# Patient Record
Sex: FEMALE | Race: WHITE | NOT HISPANIC OR LATINO | Employment: FULL TIME | URBAN - METROPOLITAN AREA
[De-identification: names, ages, dates, MRNs, and addresses within clinical notes are randomized per-mention and may not be internally consistent; named-entity substitution may affect disease eponyms.]

---

## 2018-06-27 ENCOUNTER — TRANSFERRED RECORDS (OUTPATIENT)
Dept: HEALTH INFORMATION MANAGEMENT | Facility: CLINIC | Age: 53
End: 2018-06-27

## 2018-06-27 LAB
HPV ABSTRACT: NORMAL
PAP-ABSTRACT: NORMAL

## 2019-04-14 ENCOUNTER — APPOINTMENT (OUTPATIENT)
Dept: GENERAL RADIOLOGY | Facility: CLINIC | Age: 54
End: 2019-04-14
Attending: STUDENT IN AN ORGANIZED HEALTH CARE EDUCATION/TRAINING PROGRAM

## 2019-04-14 ENCOUNTER — HOSPITAL ENCOUNTER (EMERGENCY)
Facility: CLINIC | Age: 54
Discharge: HOME | End: 2019-04-14
Attending: STUDENT IN AN ORGANIZED HEALTH CARE EDUCATION/TRAINING PROGRAM | Admitting: STUDENT IN AN ORGANIZED HEALTH CARE EDUCATION/TRAINING PROGRAM
Payer: OTHER MISCELLANEOUS

## 2019-04-14 VITALS
HEIGHT: 67 IN | SYSTOLIC BLOOD PRESSURE: 147 MMHG | RESPIRATION RATE: 16 BRPM | DIASTOLIC BLOOD PRESSURE: 91 MMHG | TEMPERATURE: 98.3 F | OXYGEN SATURATION: 99 % | WEIGHT: 160 LBS | BODY MASS INDEX: 25.11 KG/M2

## 2019-04-14 DIAGNOSIS — S82.892A ANKLE FRACTURE, LEFT, CLOSED, INITIAL ENCOUNTER: ICD-10-CM

## 2019-04-14 PROBLEM — I10 ESSENTIAL HYPERTENSION: Status: ACTIVE | Noted: 2018-06-27

## 2019-04-14 PROBLEM — Z12.4 CERVICAL CANCER SCREENING: Status: ACTIVE | Noted: 2018-07-05

## 2019-04-14 PROCEDURE — 27786 TREATMENT OF ANKLE FRACTURE: CPT | Mod: LT | Performed by: STUDENT IN AN ORGANIZED HEALTH CARE EDUCATION/TRAINING PROGRAM

## 2019-04-14 PROCEDURE — 99284 EMERGENCY DEPT VISIT MOD MDM: CPT | Mod: 25 | Performed by: STUDENT IN AN ORGANIZED HEALTH CARE EDUCATION/TRAINING PROGRAM

## 2019-04-14 PROCEDURE — 27786 TREATMENT OF ANKLE FRACTURE: CPT | Mod: 54 | Performed by: STUDENT IN AN ORGANIZED HEALTH CARE EDUCATION/TRAINING PROGRAM

## 2019-04-14 PROCEDURE — 73610 X-RAY EXAM OF ANKLE: CPT | Mod: LT

## 2019-04-14 RX ORDER — OXYCODONE HYDROCHLORIDE 5 MG/1
2.5-1 TABLET ORAL EVERY 6 HOURS PRN
Qty: 10 TABLET | Refills: 0 | Status: SHIPPED | OUTPATIENT
Start: 2019-04-14 | End: 2020-09-09

## 2019-04-14 RX ORDER — LOSARTAN POTASSIUM 50 MG/1
50 TABLET ORAL
COMMUNITY
Start: 2018-06-27 | End: 2020-09-09

## 2019-04-14 RX ORDER — AMPICILLIN TRIHYDRATE 500 MG
4000 CAPSULE ORAL
COMMUNITY
Start: 2016-05-13 | End: 2023-10-09

## 2019-04-14 ASSESSMENT — MIFFLIN-ST. JEOR: SCORE: 1353.39

## 2019-04-14 NOTE — ED AVS SNAPSHOT
[facility] Emergency Department  [address] [address]  Phone:  [phone]  Fax:  [phone]                                    Araceli Wilson   MRN: [id number]    Department:  [facility] Emergency Department   Date of Visit:  4/14/2019           After Visit Summary Signature Page    I have received my discharge instructions, and my questions have been answered. I have discussed any challenges I see with this plan with the nurse or doctor.    ..........................................................................................................................................  Patient/Patient Representative Signature      ..........................................................................................................................................  Patient Representative Print Name and Relationship to Patient    ..................................................               ................................................  Date                                   Time    ..........................................................................................................................................  Reviewed by Signature/Title    ...................................................              ..............................................  Date                                               Time          22EPIC Rev 08/18

## 2019-04-14 NOTE — ED PROVIDER NOTES
History     Chief Complaint   Patient presents with     Trauma     slipped on ice last noc, still having pain this am, unable to BW, CMS intact      HPI  Araceli Wilson is a 53 year old female who presents for evaluation of left lateral ankle pain after injury sustained last evening.  Patient extends at around 10 PM last night she was walking around a corner and slipped on the ice outdoors causing her to fall onto her left side.  She adamantly denies striking her head or suffering loss of consciousness.  She had significant left lateral ankle pain but was able to ambulate despite the pain.  She has had increasing pain and swelling today despite elevation and icing.  She denies neck pain, back pain, pelvic pain, knee pain, foot pain, or other injuries.    Allergies:  No Known Allergies    Problem List:    Patient Active Problem List    Diagnosis Date Noted     Cervical cancer screening 2018     Priority: Medium     From visit on 18:  History of abnormal pap tests?  No   NILM  2009 NILM    NILM ,neg HPV  52 y.o.  Plan: Cotesting 2023       Essential hypertension 2018     Priority: Medium        Past Medical History:    No past medical history on file.    Past Surgical History:    Past Surgical History:   Procedure Laterality Date     SURGICAL HISTORY OF -   [id number]     x2       Family History:    No family history on file.    Social History:  Marital Status:   [4]  Social History     Tobacco Use     Smoking status: Not on file   Substance Use Topics     Alcohol use: Not on file     Drug use: Not on file        Medications:      Cholecalciferol (D 1000) 1000 units CAPS   losartan (COZAAR) 50 MG tablet   oxyCODONE (ROXICODONE) 5 MG tablet         Review of Systems  Constitutional:  Negative for fever or recent illness.  Respiratory:  Negative for shortness of breath.  Gastrointestinal:  Negative for abdominal pain.  Musculoskeletal: Positive for left lateral ankle pain and  "swelling.  Neurological:  Negative for weakness or sensory deficit.  Skin:  Negative for laceration or bleeding.    All others reviewed and are negative.      Physical Exam   BP: (!) 147/91  Heart Rate: 84  Temp: 98.3  F (36.8  C)  Resp: 16  Height: 170.2 cm (5' 7\")  Weight: 72.6 kg (160 lb)  SpO2: 99 %      Physical Exam  Constitutional:  Well developed, well nourished.  Appears nontoxic and in no acute distress.   HENT:  Normocephalic and atraumatic.  Eyes:  Conjunctivae are normal.  Neck:  Neck supple.  Cardiovascular:  No cyanosis.   Respiratory:  Effort normal, no respiratory distress.   Musculoskeletal: Positive for left lateral malleolar tenderness and swelling but no obvious deformity or wound.  L5 dorsiflexion and dorsal foot sensory intact.  S1 plantar flexion and plantar foot sensation intact.  Achilles tendon intact.  Sensation intact of all digits diffusely, including deep fibular distribution of first webbing space.  2/4 palpable dorsalis pedis and posterior tibial pulses.  No cyanosis and capillary refill less than 2 seconds in each digit.    Neurological:  Patient is alert.  Skin:  Skin is warm and dry.  Psychiatric:  Normal mood and affect.      ED Course        Procedures               Critical Care time:  none               Results for orders placed or performed during the hospital encounter of 04/14/19 (from the past 24 hour(s))   XR Ankle Left G/E 3 Views    Narrative    LEFT ANKLE THREE VIEWS  4/14/2019 11:07 AM     HISTORY: Injury.    COMPARISON: None.      Impression    IMPRESSION: Minimally displaced distal fibular fracture extending from  the level of the ankle mortise superiorly and posteriorly. No  dislocation.    JENNY BUTTERFIELD MD       Medications - No data to display    Assessments & Plan (with Medical Decision Making)   Araceli Wilson is a 53 year old female who presents to the emergency department for evaluation of left lateral ankle pain and swelling.  Based on her symptoms and " the clinical examination, there is no evidence to suggest deformity, skin injury, tendon rupture, or neurovascular deficits.  Radiographic imaging was independently reviewed and a distal left fibular fracture was identified.  Radiologist read is consistent with findings.  She was placed in a splint and instructed to remain nonweightbearing with use of crutches until orthopedic follow-up over the next 3-5 days.  Should also rest, ice, and elevate.  She was also provided a prescription for a short course of analgesia to take only as directed.        Disclaimer:  This note consists of symbols derived from keyboarding, dictation, and/or voice recognition software.  As a result, there may be errors in the script that have gone undetected.  Please consider this when interpreting information found in the chart.         I have reviewed the nursing notes.    I have reviewed the findings, diagnosis, plan and need for follow up with the patient.          Medication List      Started    oxyCODONE 5 MG tablet  Commonly known as:  ROXICODONE  2.5-10 mg, Oral, EVERY 6 HOURS PRN            Final diagnoses:   Ankle fracture, left, closed, initial encounter       4/14/2019   [facility] EMERGENCY DEPARTMENT     Rodri Estrada DO  04/14/19 1723

## 2019-06-14 ENCOUNTER — HOSPITAL ENCOUNTER (OUTPATIENT)
Dept: PHYSICAL THERAPY | Facility: CLINIC | Age: 54
Setting detail: THERAPIES SERIES
End: 2019-06-14
Attending: PODIATRIST
Payer: OTHER MISCELLANEOUS

## 2019-06-14 PROCEDURE — 97110 THERAPEUTIC EXERCISES: CPT | Mod: GP | Performed by: PHYSICAL THERAPIST

## 2019-06-14 PROCEDURE — 97161 PT EVAL LOW COMPLEX 20 MIN: CPT | Mod: GP | Performed by: PHYSICAL THERAPIST

## 2019-06-14 NOTE — PROGRESS NOTES
06/14/19 1000   General Information   Type of Visit Initial OP Ortho PT Evaluation   Start of Care Date 06/14/19   Referring Physician Dr. Yan Otto   Patient/Family Goals Statement To walk normally   Orders Evaluate and Treat   Date of Order 05/30/19   Medical Diagnosis L oblique fibular fx, minimally displaced   Body Part(s)   Body Part(s) Ankle/Foot   Presentation and Etiology   Pertinent history of current problem (include personal factors and/or comorbidities that impact the POC) Pt states on 4/13/19 she fell on the ice at work and L fx fibula.  Pt wore aircast and was non wt bearing until last week.   Pt had been using leg scooter.  Pt notes intermittent pain 4/10.    Pt notes decreased motion and ongoing swelling.   Meds: ibuprofen prn.   Pt notes tingling in all toes constant.  PMHX:  unremarkable.   Mod: job tasks   Impairments B. Decreased WB tolerance;C. Swelling;D. Decreased ROM;F. Decreased strength and endurance;H. Impaired gait   Onset date of current episode/exacerbation 04/13/19   Pain quality A. Sharp;C. Aching   Pain exacerbation comment walking--cont to limp w/ restricted wt bearing.  Walking tolerance 1/2 block.  Stairs has been doing on her buttocks, just started marked time.  Wakes a couple times/ night.     Pain/symptoms eased by A. Sitting;C. Rest;F. Certain positions   Progression of symptoms since onset: Improved   Prior Level of Function   Functional Level Prior Comment gardens, fishing, shopping   Current Level of Function   Patient role/employment history A. Employed   Employment Comments ,   at night.     Fall Risk Screen   Fall screen completed by PT   Have you fallen 2 or more times in the past year? No   Have you fallen and had an injury in the past year? Yes   Timed Up and Go score (seconds) deferred w/ limited wt bearing.     Abuse Screen (yes response referral indicated)   Feels Unsafe at Home or Work/School no   Feels Threatened by Someone no   Does  Anyone Try to Keep You From Having Contact with Others or Doing Things Outside Your Home? no   Ankle/Foot Objective Findings   Side (if bilateral, select both right and left) Left   Integumentary Mild swelling.  Fig 8   1 cm difference.  R 53.0, L 54.0 cm   Gait/Locomotion with 1 crutch and trilock brace on mild + limp and holds L LE in abd position.    Ankle/Foot Strength Comments L hip flex/ abd 5/5;  isometric L ankle DF/PF good,  inv/ ev 4-/5.    Left DF (Knee Ext) AROM R 2*,  L lacks 20* of DF to neutral   Left PF AROM R 55*,  L 40*   Left Calcanceal Inversion AROM R 35*,  L 12*   Left Calcaneal Eversion AROM R 10*, L 4*   Planned Therapy Interventions   Planned Therapy Interventions manual therapy;ROM;strengthening;stretching;neuromuscular re-education;gait training   Clinical Impression   Criteria for Skilled Therapeutic Interventions Met yes, treatment indicated   PT Diagnosis s/p L fibular fx   Influenced by the following impairments pain, swelliing, decreased motion, decreased strength   Functional limitations due to impairments walking, stairs,   sleeping, working   Clinical Presentation Stable/Uncomplicated   Clinical Presentation Rationale progressing as expected following fracture   Clinical Decision Making (Complexity) Low complexity   Therapy Frequency 2 times/Week   Predicted Duration of Therapy Intervention (days/wks) 3 weeks then 1X/wk X 4 = 10 visits   Risk & Benefits of therapy have been explained Yes   Patient, Family & other staff in agreement with plan of care Yes   Education Assessment   Barriers to Learning No barriers   Ortho Goal 1   Goal Description 1.  Pt will walk w/o device w/ slight limp   Target Date 07/14/19   Ortho Goal 2   Goal Description 2.  Pt will be able to do stairs reciprocal w/ minimal difficulty   Target Date 08/13/19   Ortho Goal 3   Goal Description 3.  Pt will wake no > 3X/wk due to ankle   Target Date 08/13/19   Ortho Goal 4   Goal Description 4.  Pt will be able to  walk 30 min w/o limp   Target Date 08/13/19   Ortho Goal 5   Goal Description 5. Pt will be independent and consistent w/HEP   Target Date 08/13/19   Total Evaluation Time   PT Eval, Low Complexity Minutes (18296) 20     Thank you for this referral,    Cassandra Leon, PT,  CEAS   #[id number]  [facility] Dept.  [phone]

## 2019-06-17 ENCOUNTER — HOSPITAL ENCOUNTER (OUTPATIENT)
Dept: PHYSICAL THERAPY | Facility: CLINIC | Age: 54
Setting detail: THERAPIES SERIES
End: 2019-06-17
Attending: PODIATRIST
Payer: OTHER MISCELLANEOUS

## 2019-06-17 PROCEDURE — 97110 THERAPEUTIC EXERCISES: CPT | Mod: GP | Performed by: PHYSICAL THERAPIST

## 2019-06-21 ENCOUNTER — HOSPITAL ENCOUNTER (OUTPATIENT)
Dept: PHYSICAL THERAPY | Facility: CLINIC | Age: 54
Setting detail: THERAPIES SERIES
End: 2019-06-21
Attending: PODIATRIST
Payer: OTHER MISCELLANEOUS

## 2019-06-21 PROCEDURE — 97110 THERAPEUTIC EXERCISES: CPT | Mod: GP | Performed by: PHYSICAL THERAPIST

## 2019-06-24 ENCOUNTER — HOSPITAL ENCOUNTER (OUTPATIENT)
Dept: PHYSICAL THERAPY | Facility: CLINIC | Age: 54
Setting detail: THERAPIES SERIES
End: 2019-06-24
Attending: PODIATRIST
Payer: OTHER MISCELLANEOUS

## 2019-06-24 PROCEDURE — 97110 THERAPEUTIC EXERCISES: CPT | Mod: GP | Performed by: PHYSICAL THERAPIST

## 2019-07-03 ENCOUNTER — HOSPITAL ENCOUNTER (OUTPATIENT)
Dept: PHYSICAL THERAPY | Facility: CLINIC | Age: 54
Setting detail: THERAPIES SERIES
End: 2019-07-03
Attending: PODIATRIST
Payer: OTHER MISCELLANEOUS

## 2019-07-03 PROCEDURE — 97110 THERAPEUTIC EXERCISES: CPT | Mod: GP | Performed by: PHYSICAL THERAPIST

## 2019-07-12 ENCOUNTER — HOSPITAL ENCOUNTER (OUTPATIENT)
Dept: PHYSICAL THERAPY | Facility: CLINIC | Age: 54
Setting detail: THERAPIES SERIES
End: 2019-07-12
Attending: PODIATRIST
Payer: OTHER MISCELLANEOUS

## 2019-07-12 PROCEDURE — 97110 THERAPEUTIC EXERCISES: CPT | Mod: GP | Performed by: PHYSICAL THERAPIST

## 2019-07-23 ENCOUNTER — HOSPITAL ENCOUNTER (OUTPATIENT)
Dept: PHYSICAL THERAPY | Facility: CLINIC | Age: 54
Setting detail: THERAPIES SERIES
End: 2019-07-23
Attending: PODIATRIST
Payer: OTHER MISCELLANEOUS

## 2019-07-23 PROCEDURE — 97110 THERAPEUTIC EXERCISES: CPT | Mod: GP | Performed by: PHYSICAL THERAPIST

## 2019-07-30 ENCOUNTER — HOSPITAL ENCOUNTER (OUTPATIENT)
Dept: PHYSICAL THERAPY | Facility: CLINIC | Age: 54
Setting detail: THERAPIES SERIES
End: 2019-07-30
Attending: PODIATRIST
Payer: OTHER MISCELLANEOUS

## 2019-07-30 PROCEDURE — 97110 THERAPEUTIC EXERCISES: CPT | Mod: GP | Performed by: PHYSICAL THERAPIST

## 2019-07-30 NOTE — PROGRESS NOTES
"   OUTPATIENT PHYSICAL THERAPY PROGRESS NOTE    Dr. Yan Otto      6/14/19 to 07/30/19 1100   Signing Clinician's Name / Credentials   Signing clinician's name / credentials Cassandra Leon, PT [id number]   Session Number   Session Number 7    Authorization status 11 approved thru 8/13/19   Ortho Goal 1   Goal Description 1.  Pt will walk w/o device w/ slight limp.  7/3/19 mild limp.  7/30/19 slgiht limp MET   Target Date 07/14/19   Ortho Goal 2   Goal Description 2.  Pt will be able to do stairs reciprocal w/ minimal difficulty   Target Date 08/13/19   Ortho Goal 3   Goal Description 3.  Pt will wake no > 3X/wk due to ankle   Target Date 08/13/19   Ortho Goal 4   Goal Description 4.  Pt will be able to walk 30 min w/o limp   Target Date 08/13/19   Ortho Goal 5   Goal Description 5. Pt will be independent and consistent w/HEP.  7/30/19 MET   Target Date 08/13/19   Subjective Report   Subjective Report Pt sees MD 8/1/19. Pt reports she is frustrated w/ not being back to normal.   Pt cont to report tightness across top of foot which limits toes to bend w/ gait.  Pt also reports swelling which limits ankle joint.  Pt states she is on her feet alot/ active (reinforced ice/ elevation and to avoid overdoing w/ activity).     Objective Measures   Objective Measure slight limp, lacks full push off due to pain top of foot/ base of toes.     Details AROM L ankle DF to neutral,  PF 40, inversion 28*, eversion 10*   Objective Measure standing gastroc stretch R 30*, L 27*   Details mild + swelling noted   Therapeutic Procedure/exercise   Treatment Detail Scifit seat 8 L 4 X 2 min.   Gastroc stretch 30\" X 2 ( incline board).      2\" step down x 15 w/ UE support (noted some hip irritation).     Partial squats in front of mirror x 15.  Heel toe walk 20' X 2.   SLS 30\" X 2 (5 touches both times ).   tandem stand on foam (L foot back)  30\" X 2 ( 1 touch, 0 touch)..    Seated heelraises X 10 (pt reports lateral ankle and dorsal foot " pain at metatarsals).  PROM of toes w/ PT ( appear WNL)   Plan   Home program ex as above, ice prn   Plan cont 1X/wk progress ex program   Comments   Comments Progress as noted above.

## 2019-08-06 ENCOUNTER — HOSPITAL ENCOUNTER (OUTPATIENT)
Dept: PHYSICAL THERAPY | Facility: CLINIC | Age: 54
Setting detail: THERAPIES SERIES
End: 2019-08-06
Attending: PODIATRIST
Payer: OTHER MISCELLANEOUS

## 2019-08-06 PROCEDURE — 97110 THERAPEUTIC EXERCISES: CPT | Mod: GP | Performed by: PHYSICAL THERAPIST

## 2019-09-17 NOTE — PROGRESS NOTES
"   OUTPATIENT PHYSICAL THERAPY DISCHARGE SUMMARY   Dr. Yan Otto 614/19 to 08/06/19 1100   Signing Clinician's Name / Credentials   Signing clinician's name / credentials Cassandragermán Leon, PT [id number]   Session Number   Session Number 8    Ortho Goal 1   Goal Description 1.  Pt will walk w/o device w/ slight limp.  7/3/19 mild limp.  7/30/19 slgiht limp MET   Target Date 07/14/19   Ortho Goal 2   Goal Description 2.  Pt will be able to do stairs reciprocal w/ minimal difficulty   Target Date 08/13/19   Ortho Goal 3   Goal Description 3.  Pt will wake no > 3X/wk due to ankle   Target Date 08/13/19   Ortho Goal 4   Goal Description 4.  Pt will be able to walk 30 min w/o limp   Target Date 08/13/19   Ortho Goal 5   Goal Description 5. Pt will be independent and consistent w/HEP.  7/30/19 MET   Target Date 08/13/19   Subjective Report   Subjective Report Pt saw MD was told the anterior foot is a sprain which is limiting her toe off.  Pt was told she is progressing,  xrays  shows good healing and to expect ongoing intermittent swelling.  Pt is to cont w/ therapy.   Pt remains off work currently.     Objective Measures   Objective Measure slight limp, lacks full push off due to pain top of foot/ base of toes.     Details AROM L ankle DF to neutral,  PF 40, inversion 28*, eversion 10*   Objective Measure standing gastroc stretch R 30*, L 27*   Details mild + swelling noted   Therapeutic Procedure/exercise   Treatment Detail  Gastroc stretch 30\"  ( incline board).      2\" step down x 20 w/ UE support (noted anterior foot/ metatarsal discomfort).     Partial squats in front of mirror x 20 (cuing for = wt bearing).  Heel toe walk 20' X 2.   SLS 30\" X 2 (3, 4  touches ).   tandem stand on foam (L foot back)  30\" X 2 ( no touches)..  Blue rockerboard w/ UE support DF/PF X 10 B;  2 foot balance X 1 min ( 3 touches).  Standing march w/o UE support X 20 B.   Seated heelraises X 15   Plan   Home program ex as above, ice prn   Plan " Pt cancelled follow up visits and did not reschedule.  Discharge from physical therapy as patient has not been seen in past 30 days.   Comments   Comments Progress towards goals as noted above.

## 2019-09-17 NOTE — ADDENDUM NOTE
Encounter addended by: Cassandra Leon, PT on: 9/17/2019 9:44 AM   Actions taken: Sign clinical note, Flowsheet accepted, Episode resolved

## 2020-09-08 NOTE — PATIENT INSTRUCTIONS
1. Flu and pneumonia booster today  Shingrix shingles vaccine: we ask that you check with your insurance for the cost    2. To lab    Hemorrhoids: preparation H when the symptoms are bothersome.  Baths.  Keep stools soft.    We did the freezing treatments for the skin tags, they should just fall off with in 2 weeks.  If they blister just keep them covered don't try to pop.    Blood pressure is well controlled, so I sent refills for the year.    Preventive Health Recommendations  Female Ages 50 - 64    Yearly exam: See your health care provider every year in order to  o Review health changes.   o Discuss preventive care.    o Review your medicines if your doctor has prescribed any.      Get a Pap test every three years (unless you have an abnormal result and your provider advises testing more often).    If you get Pap tests with HPV test, you only need to test every 5 years, unless you have an abnormal result.     You do not need a Pap test if your uterus was removed (hysterectomy) and you have not had cancer.    You should be tested each year for STDs (sexually transmitted diseases) if you're at risk.     Have a mammogram every 1 to 2 years.     Have a colonoscopy at age 50, or have a yearly FIT test (stool test). These exams screen for colon cancer.      Have a cholesterol test every 5 years, or more often if advised.    Have a diabetes test (fasting glucose) every three years. If you are at risk for diabetes, you should have this test more often.     If you are at risk for osteoporosis (brittle bone disease), think about having a bone density scan (DEXA).    Shots: Get a flu shot each year. Get a tetanus shot every 10 years.    Nutrition:     Eat at least 5 servings of fruits and vegetables each day.    Eat whole-grain bread, whole-wheat pasta and brown rice instead of white grains and rice.    Get adequate Calcium and Vitamin D.     Lifestyle    Exercise at least 150 minutes a week (30 minutes a day, 5 days a  week). This will help you control your weight and prevent disease.    Limit alcohol to one drink per day.    No smoking.     Wear sunscreen to prevent skin cancer.     See your dentist every six months for an exam and cleaning.    See your eye doctor every 1 to 2 years.

## 2020-09-08 NOTE — PROGRESS NOTES
SUBJECTIVE:   CC: Araceli Wilson is an 55 year old woman who presents for preventive health visit.     Healthy Habits:    Do you get at least three servings of calcium containing foods daily (dairy, green leafy vegetables, etc.)? yes    Amount of exercise or daily activities, outside of work: 4-5 day(s) per week    Problems taking medications regularly No    Medication side effects: No    Have you had an eye exam in the past two years? yes    Do you see a dentist twice per year? no    Do you have sleep apnea, excessive snoring or daytime drowsiness? yes- excessive snoring  Hemorrhoids.  Please abstract the following data from this visit with this patient into the appropriate field in Epic:    Tests that can be patient reported without a hard copy:    Mammogram done on this date: 7/3/2018 (approximately), by this group:  [facility], results were Negative.  and Pap smear done on this date: 6/27/2018 (approximately), by this group: [facility], results were Negative. HPV Neg.      Hypertension Follow-up      Do you check your blood pressure regularly outside of the clinic? No     Are you following a low salt diet? Yes    Are your blood pressures ever more than 140 on the top number (systolic) OR more   than 90 on the bottom number (diastolic), for example 140/90? Yes      Today's PHQ-2 Score: No flowsheet data found.    Abuse: Current or Past(Physical, Sexual or Emotional)- No  Do you feel safe in your environment? Yes    Have you ever done Advance Care Planning? (For example, a Health Directive, POLST, or a discussion with a medical provider or your loved ones about your wishes): No, advance care planning information given to patient to review.  Patient declined advance care planning discussion at this time.    Social History     Tobacco Use     Smoking status: Not on file   Substance Use Topics     Alcohol use: Not on file     If you drink alcohol do you typically have >3 drinks per day or >7 drinks per week?  "No                     Reviewed orders with patient.  Reviewed health maintenance and updated orders accordingly - Yes  BP Readings from Last 3 Encounters:   09/09/20 (!) 146/84   04/14/19 (!) 147/91    Wt Readings from Last 3 Encounters:   09/09/20 75.7 kg (166 lb 12.8 oz)   04/14/19 72.6 kg (160 lb)                    Mammogram Screening: Patient over age 50, mutual decision to screen reflected in health maintenance.    Pertinent mammograms are reviewed under the imaging tab.  History of abnormal Pap smear: NO - age 30- 65 PAP every 3 years recommended     Reviewed and updated as needed this visit by clinical staff  Tobacco  Allergies  Meds  Med Hx  Surg Hx  Fam Hx  Soc Hx        Reviewed and updated as needed this visit by Provider  Tobacco            ROS:  CONSTITUTIONAL: NEGATIVE for fever, chills, change in weight  INTEGUMENTARY/SKIN: NEGATIVE for worrisome rashes, moles or lesions  EYES: NEGATIVE for vision changes or irritation  ENT: NEGATIVE for ear, mouth and throat problems  RESP: NEGATIVE for significant cough or SOB  BREAST: NEGATIVE for masses, tenderness or discharge  CV: NEGATIVE for chest pain, palpitations or peripheral edema  GI: NEGATIVE for nausea, abdominal pain, heartburn, or change in bowel habits  : NEGATIVE for unusual urinary or vaginal symptoms. No vaginal bleeding.  MUSCULOSKELETAL: NEGATIVE for significant arthralgias or myalgia  NEURO: NEGATIVE for weakness, dizziness or paresthesias  PSYCHIATRIC: NEGATIVE for changes in mood or affect     OBJECTIVE:   /72 (BP Location: Right arm)   Pulse 74   Temp 97.2  F (36.2  C) (Tympanic)   Resp 16   Ht 1.697 m (5' 6.8\")   Wt 75.7 kg (166 lb 12.8 oz)   SpO2 98%   BMI 26.28 kg/m    EXAM:  GENERAL APPEARANCE: healthy, alert and no distress  EYES: Eyes grossly normal to inspection, PERRL and conjunctivae and sclerae normal  HENT: ear canals and TM's normal, nose and mouth without ulcers or lesions, oropharynx clear and oral " mucous membranes moist  NECK: no adenopathy, no asymmetry, masses, or scars and thyroid normal to palpation  RESP: lungs clear to auscultation - no rales, rhonchi or wheezes  BREAST: normal without masses, [id number] or nipple discharge and no palpable axillary masses or adenopathy  CV: regular rate and rhythm, normal S1 S2, no S3 or S4, no murmur, click or rub, no peripheral edema and peripheral pulses strong  ABDOMEN: soft, nontender, no hepatosplenomegaly, no masses and bowel sounds normal  MS: no musculoskeletal defects are noted and gait is age appropriate without ataxia  SKIN:Actinic keratosis: 3 on left leg and 1 on the right leg, The lesions were frozen to an ice ball of 3mm beyond the lesion and allowed to completely thaw and the freeze/thaw cycle was repeated one more times.  Skin tags three in the left axilla and one in the right axilla and on in the right groin.  The lesions were frozen to an ice ball of 3mm beyond the lesion and allowed to completely thaw and the freeze/thaw cycle was repeated one more time.  Many moles  hemangiomas   no othersuspicious lesions or rashes  NEURO: Normal strength and tone, sensory exam grossly normal, mentation intact and speech normal  PSYCH: mentation appears normal and affect normal/bright    Diagnostic Test Results:  Labs reviewed in Epic    ASSESSMENT/PLAN:   Araceli was seen today for physical.    Diagnoses and all orders for this visit:    Routine general medical examination at a health care facility  -     Lipid panel reflex to direct LDL Fasting    Essential hypertension: stable, refill and recheck labs  -     losartan (COZAAR) 50 MG tablet; Take 1 tablet (50 mg) by mouth daily  -     Basic metabolic panel  (Ca, Cl, CO2, Creat, Gluc, K, Na, BUN)    Colon cancer screening  -     GASTROENTEROLOGY ADULT REF PROCEDURE ONLY; Future    Cervical cancer screening  -     HPV High Risk Types DNA Cervical  -     Pap imaged thin layer screen with HPV - recommended age 30 - 65  "years (select HPV order below)      Actinic keratosis  -     DESTRUCT PREMALIGNANT LESION, 2-14    Skin tag  -     DESTRUCT BENIGN LESION, UP TO 14              COUNSELING:   Reviewed preventive health counseling, as reflected in patient instructions       Regular exercise       Healthy diet/nutrition       Vision screening       Colon cancer screening       One time pneumovax for smokers    Estimated body mass index is 26.28 kg/m  as calculated from the following:    Height as of this encounter: 1.697 m (5' 6.8\").    Weight as of this encounter: 75.7 kg (166 lb 12.8 oz).    Weight management plan: Discussed healthy diet and exercise guidelines    She reports that she has been smoking cigarettes. She has never used smokeless tobacco.  Less than a pack a week. Social.    Counseling Resources:  ATP IV Guidelines  Pooled Cohorts Equation Calculator  Breast Cancer Risk Calculator  BRCA-Related Cancer Risk Assessment: FHS-7 Tool  FRAX Risk Assessment  ICSI Preventive Guidelines  Dietary Guidelines for Americans, 2010  USDA's MyPlate  ASA Prophylaxis  Lung CA Screening    Steve Angel MD  [facility]  "

## 2020-09-09 ENCOUNTER — OFFICE VISIT (OUTPATIENT)
Dept: FAMILY MEDICINE | Facility: CLINIC | Age: 55
End: 2020-09-09
Payer: COMMERCIAL

## 2020-09-09 VITALS
HEIGHT: 67 IN | OXYGEN SATURATION: 98 % | TEMPERATURE: 97.2 F | WEIGHT: 166.8 LBS | HEART RATE: 74 BPM | DIASTOLIC BLOOD PRESSURE: 72 MMHG | BODY MASS INDEX: 26.18 KG/M2 | SYSTOLIC BLOOD PRESSURE: 128 MMHG | RESPIRATION RATE: 16 BRPM

## 2020-09-09 DIAGNOSIS — L57.0 ACTINIC KERATOSIS: ICD-10-CM

## 2020-09-09 DIAGNOSIS — Z12.4 CERVICAL CANCER SCREENING: ICD-10-CM

## 2020-09-09 DIAGNOSIS — Z12.11 COLON CANCER SCREENING: ICD-10-CM

## 2020-09-09 DIAGNOSIS — Z00.00 ROUTINE GENERAL MEDICAL EXAMINATION AT A HEALTH CARE FACILITY: Primary | ICD-10-CM

## 2020-09-09 DIAGNOSIS — L91.8 SKIN TAG: ICD-10-CM

## 2020-09-09 DIAGNOSIS — I10 ESSENTIAL HYPERTENSION: ICD-10-CM

## 2020-09-09 DIAGNOSIS — Z23 NEED FOR PROPHYLACTIC VACCINATION AND INOCULATION AGAINST INFLUENZA: ICD-10-CM

## 2020-09-09 LAB
ANION GAP SERPL CALCULATED.3IONS-SCNC: 1 MMOL/L (ref 3–14)
BUN SERPL-MCNC: 14 MG/DL (ref 7–30)
CALCIUM SERPL-MCNC: 8.8 MG/DL (ref 8.5–10.1)
CHLORIDE SERPL-SCNC: 112 MMOL/L (ref 94–109)
CHOLEST SERPL-MCNC: 221 MG/DL
CO2 SERPL-SCNC: 31 MMOL/L (ref 20–32)
CREAT SERPL-MCNC: 0.64 MG/DL (ref 0.52–1.04)
GFR SERPL CREATININE-BSD FRML MDRD: >90 ML/MIN/{1.73_M2}
GLUCOSE SERPL-MCNC: 89 MG/DL (ref 70–99)
HDLC SERPL-MCNC: 70 MG/DL
LDLC SERPL CALC-MCNC: 131 MG/DL
NONHDLC SERPL-MCNC: 151 MG/DL
POTASSIUM SERPL-SCNC: 4.2 MMOL/L (ref 3.4–5.3)
SODIUM SERPL-SCNC: 144 MMOL/L (ref 133–144)
TRIGL SERPL-MCNC: 100 MG/DL

## 2020-09-09 PROCEDURE — 99386 PREV VISIT NEW AGE 40-64: CPT | Mod: 25 | Performed by: FAMILY MEDICINE

## 2020-09-09 PROCEDURE — 99213 OFFICE O/P EST LOW 20 MIN: CPT | Mod: 25 | Performed by: FAMILY MEDICINE

## 2020-09-09 PROCEDURE — G0145 SCR C/V CYTO,THINLAYER,RESCR: HCPCS | Performed by: FAMILY MEDICINE

## 2020-09-09 PROCEDURE — G0476 HPV COMBO ASSAY CA SCREEN: HCPCS | Performed by: FAMILY MEDICINE

## 2020-09-09 PROCEDURE — 80048 BASIC METABOLIC PNL TOTAL CA: CPT | Performed by: FAMILY MEDICINE

## 2020-09-09 PROCEDURE — 90472 IMMUNIZATION ADMIN EACH ADD: CPT | Performed by: FAMILY MEDICINE

## 2020-09-09 PROCEDURE — 36415 COLL VENOUS BLD VENIPUNCTURE: CPT | Performed by: FAMILY MEDICINE

## 2020-09-09 PROCEDURE — 87624 HPV HI-RISK TYP POOLED RSLT: CPT | Performed by: FAMILY MEDICINE

## 2020-09-09 PROCEDURE — 17110 DESTRUCTION B9 LES UP TO 14: CPT | Mod: 59 | Performed by: FAMILY MEDICINE

## 2020-09-09 PROCEDURE — 90682 RIV4 VACC RECOMBINANT DNA IM: CPT | Performed by: FAMILY MEDICINE

## 2020-09-09 PROCEDURE — 17000 DESTRUCT PREMALG LESION: CPT | Performed by: FAMILY MEDICINE

## 2020-09-09 PROCEDURE — 90732 PPSV23 VACC 2 YRS+ SUBQ/IM: CPT | Performed by: FAMILY MEDICINE

## 2020-09-09 PROCEDURE — 80061 LIPID PANEL: CPT | Performed by: FAMILY MEDICINE

## 2020-09-09 PROCEDURE — 90471 IMMUNIZATION ADMIN: CPT | Performed by: FAMILY MEDICINE

## 2020-09-09 RX ORDER — LOSARTAN POTASSIUM 50 MG/1
50 TABLET ORAL DAILY
Qty: 90 TABLET | Refills: 3 | Status: SHIPPED | OUTPATIENT
Start: 2020-09-09 | End: 2022-06-24

## 2020-09-09 ASSESSMENT — MIFFLIN-ST. JEOR: SCORE: 1371.05

## 2020-09-09 NOTE — Clinical Note
Abstract from care everywhere:  Mammogram done on this date: 7/3/2018 (approximately), by this group:  [facility], results were Negative.  and Pap smear done on this date: 6/27/2018 (approximately), by this group: [facility], results were Negative. HPV Neg.

## 2020-09-09 NOTE — LETTER
September 9, 2020      Araceli TRENT Katie  [id number]  [facility] [address]        Dear ,    We are writing to inform you of your test results.    Normal electrolyte and kidney tests.   Cholesterol was just a little high, not too high to need medication, yet.  Often stopping smoking is the best prevention for heart attack and then we can avoid the cholesterol lower medication in the future.   To improve your cholesterol exercise 30 minutes per day five days a week, increase eating fresh or frozen fruits and vegetables at least 5 per day, increase eating lean meats like fish, and avoid fried foods.     Resulted Orders   Basic metabolic panel  (Ca, Cl, CO2, Creat, Gluc, K, Na, BUN)   Result Value Ref Range    Sodium 144 133 - 144 mmol/L    Potassium 4.2 3.4 - 5.3 mmol/L    Chloride 112 (H) 94 - 109 mmol/L    Carbon Dioxide 31 20 - 32 mmol/L    Anion Gap 1 (L) 3 - 14 mmol/L    Glucose 89 70 - 99 mg/dL      Comment:      Fasting specimen    Urea Nitrogen 14 7 - 30 mg/dL    Creatinine 0.64 0.52 - 1.04 mg/dL    GFR Estimate >90 >60 mL/min/[1.73_m2]      Comment:      Non  GFR Calc  Starting 12/18/2018, serum creatinine based estimated GFR (eGFR) will be   calculated using the Chronic Kidney Disease Epidemiology Collaboration   (CKD-EPI) equation.      GFR Estimate If Black >90 >60 mL/min/[1.73_m2]      Comment:       GFR Calc  Starting 12/18/2018, serum creatinine based estimated GFR (eGFR) will be   calculated using the Chronic Kidney Disease Epidemiology Collaboration   (CKD-EPI) equation.      Calcium 8.8 8.5 - 10.1 mg/dL   Lipid panel reflex to direct LDL Fasting   Result Value Ref Range    Cholesterol 221 (H) <200 mg/dL      Comment:      Desirable:       <200 mg/dl    Triglycerides 100 <150 mg/dL      Comment:      Fasting specimen    HDL Cholesterol 70 >49 mg/dL    LDL Cholesterol Calculated 131 (H) <100 mg/dL      Comment:      Above desirable:  100-129  mg/dl  Borderline High:  130-159 mg/dL  High:             160-189 mg/dL  Very high:       >189 mg/dl      Non HDL Cholesterol 151 (H) <130 mg/dL      Comment:      Above Desirable:  130-159 mg/dl  Borderline high:  160-189 mg/dl  High:             190-219 mg/dl  Very high:       >219 mg/dl         If you have any questions or concerns, please call the clinic at the number listed above.       Sincerely,        Steve Angel MD

## 2020-09-11 ENCOUNTER — NURSE TRIAGE (OUTPATIENT)
Dept: FAMILY MEDICINE | Facility: CLINIC | Age: 55
End: 2020-09-11

## 2020-09-11 NOTE — TELEPHONE ENCOUNTER
".    Additional Information    Negative: Difficulty with breathing or swallowing starts within 2 hours after injection    Negative: Difficult to awaken or acting confused (e.g., disoriented, slurred speech)    Negative: Unresponsive, passed out, or very weak    Negative: Sounds like a life-threatening emergency to the triager    Negative: Redness or red streak around the injection site begins > 48 hours after shot    Negative: Measles vaccine and purple/blood-colored rash (onset day 6-12)    Negative: Fever > 100.0 F (37.8 C) and bedridden (e.g., nursing home patient, stroke, chronic illness, recovering from surgery)    Negative: Fever > 100.0 F (37.8 C) and has diabetes mellitus or a weak immune system (e.g., HIV positive, cancer chemotherapy, organ transplant, splenectomy, chronic steroids)    Negative: Fever > 101 F (38.3 C) and over 60 years of age    Negative: Fever > 103 F (39.4 C)    Sounds like a severe, unusual reaction to the triager    Negative: Fever present > 3 days (72 hours)    Negative: Smallpox vaccine and eye pain, eye redness, or rash on eyelids    Negative: Deep lump follows (in 2 to 8 weeks) Td or TDaP shot, and becomes tender to the touch    Patient wants to be seen    Answer Assessment - Initial Assessment Questions  1. SYMPTOMS: \"What is the main symptom?\" (e.g., redness, swelling, pain)       Pt reports severe left arm pain at the injection site after immunizations for influenza and pneumonia.  2. ONSET: \"When was the vaccine (shot) given?\" \"How much later did the  begin?\" (e.g., hours, days ago)       Within hours of injection  3. SEVERITY: \"How bad is it?\"       Pt says pain is \"severe.\"  \"I am laying in bed and can frickin barely move my arm!\"  Pt says that she cannot raise her arm.  She reports nausea and headache.  4. FEVER: \"Is there a fever?\" If so, ask: \"What is it, how was it measured, and when did it start?\"       Denies   5. IMMUNIZATIONS GIVEN: \"What shots have you recently " "received?\"      Flu and pneumonia  6. PAST REACTIONS: \"Have you reacted to immunizations before?\" If so, ask: \"What happened?\"      Denies   7. OTHER SYMPTOMS: \"Do you have any other symptoms?\"      Headache, nausea.  Arm feels hot to touch and skin feels tight.    Protocols used: IMMUNIZATION [id number]      "

## 2020-09-11 NOTE — TELEPHONE ENCOUNTER
Reason for Call:  Left arm pain    Detailed comments: patient is calling and stating that every since her immunizations the other day, flu and pneumonia, her arm is so sore, and she can barely use it. Is this normal? What can she do for this. Please advise.    Phone Number Patient can be reached at: Home number on file [phone] (home)    Best Time: any    Can we leave a detailed message on this number? YES   Pricilla Morrow  [facility]       Call taken on 9/11/2020 at 1:30 PM by Pricilla Carolina

## 2020-09-14 ENCOUNTER — TELEPHONE (OUTPATIENT)
Dept: FAMILY MEDICINE | Facility: CLINIC | Age: 55
End: 2020-09-14

## 2020-09-14 LAB
COPATH REPORT: NORMAL
PAP: NORMAL

## 2020-09-14 NOTE — TELEPHONE ENCOUNTER
"After asking the CMA who administered the injection who couldn't remember why both were given in the same arm..  Which both pneumonia and flu can be given in the same arm.  I have called the patient. The patient is very very angry.  States she has spoken to doctors and is told this should never happen.  These shots should never be given in the same arm.  It is hard for this RN to get a word in edge wise as the patient goes on and on.  Patient states she had fever, body aches and she physically had to lift her arm.  She was ill for 4 days and her son had to wait on her.  Patient missed work.  She had nausea and diarrhea.  Patient states he arm was reddened/ purple from the shoulder to the elbow. When asked why she did not go to the ER she states she couldn't get out of bed.  I have attempted to read the flyers that go with these immunizations to the patient and asked if she received the flyers.  All patient says is that yes but \"I am not getting it\" meaning this RN doesn't get it.  I have advised I will turn this over to the .  The patient hangs up on this RN. Dominique PALENCIA RN      "

## 2020-09-14 NOTE — TELEPHONE ENCOUNTER
Reason for call:  Patient reporting a symptom from vaccines.     Symptom or request: Patient has been sick the last few days after getting Flu shot and ammonia booster shot.   She said she has been doing research and talking with people in the health care field she said that who ever gave her shot they where not supposed to be given in the same arm. She is very upset with this.   Duration (how long have symptoms been present): had them done on 9/9/2020 started to get sick the day after.    Have you been treated for this before? No    Phone Number patient can be reached at:  Home number on file [phone] (home)    Best Time:  any    Can we leave a detailed message on this number:  YES    Call taken on 9/14/2020 at 9:43 AM by Myrtle Diaz

## 2020-09-16 ENCOUNTER — PATIENT OUTREACH (OUTPATIENT)
Dept: FAMILY MEDICINE | Facility: CLINIC | Age: 55
End: 2020-09-16

## 2020-09-16 LAB
FINAL DIAGNOSIS: ABNORMAL
HPV HR 12 DNA CVX QL NAA+PROBE: POSITIVE
HPV16 DNA SPEC QL NAA+PROBE: NEGATIVE
HPV18 DNA SPEC QL NAA+PROBE: NEGATIVE
SPECIMEN DESCRIPTION: ABNORMAL
SPECIMEN SOURCE CVX/VAG CYTO: ABNORMAL

## 2020-09-16 NOTE — TELEPHONE ENCOUNTER
2003, 2004 NIL Paps  2005 NIL Pap  2006 NIL Pap  2007 NIL Pap  2009 NIL Pap  4/26/16 NIL Pap, + HR HPV   3/13/17 NIL Pap, + HR HPV   8/27/18  NIL Pap, neg HPV   All above from Care Everywhere    9/9/20 NIL Pap, + HR HPV (neg 16/18). Plan cotest in 1 year.

## 2021-02-22 ENCOUNTER — TELEPHONE (OUTPATIENT)
Dept: FAMILY MEDICINE | Facility: CLINIC | Age: 56
End: 2021-02-22

## 2021-02-22 DIAGNOSIS — E78.5 HYPERLIPIDEMIA LDL GOAL <130: Primary | ICD-10-CM

## 2021-02-22 NOTE — TELEPHONE ENCOUNTER
Reason for Call:  Other call back    Detailed comments: pt would like general labs including cholosterol.  Call and discuss    Phone Number Patient can be reached at: Cell number on file:    Telephone Information:   Mobile [phone]       Best Time: any    Can we leave a detailed message on this number? YES    Call taken on 2/22/2021 at 11:44 AM by Romana Navarro

## 2021-02-23 ENCOUNTER — TELEPHONE (OUTPATIENT)
Dept: FAMILY MEDICINE | Facility: CLINIC | Age: 56
End: 2021-02-23

## 2021-02-23 NOTE — TELEPHONE ENCOUNTER
Forwarding request for lipid panel to provider.  Order pended.     Patient states that when she had lipid panel done 9/2020, result was elevated, so Dr. Angel advised lifestyle changes then recheck in 6 months to see if she needs to be on medication.  She would like it done this week, as she's going out of town next week.  Patient scheduled for fasting lab appt Friday, and will forward to Dr. Angel for order. Patient only needs a call back if there is a problem.     Clarisa Tesfaye RN  [facility]

## 2021-02-23 NOTE — TELEPHONE ENCOUNTER
Spoke with pt.  She says she was told to have it checked in 6 months. This is not in the result notes.    Jake,     Lizz RAMÍREZ RN, BSN

## 2021-02-23 NOTE — TELEPHONE ENCOUNTER
Reason for Call: Request for an order:    Order being requested: Lipid Panel    Date needed: as soon as possible    Has the patient been seen by the PCP for this problem? YES    Phone number Patient can be reached at:  Home number on file [phone] (home)    Best Time:  Any    Can we leave a detailed message on this number?  YES    Call taken on 2/23/2021 at 12:05 PM by Pricilla Blancas

## 2021-02-23 NOTE — TELEPHONE ENCOUNTER
Per chart review, patient just had annual exam with labs (cholesterol, BMP) on 9/9/20.  Result notes did not indicate returning to clinic for recheck, and nothing due per health maintenance.  Returned call to patient to relay above.  No answer and VM full. Will need to try again later.     Clarisa Tesfaye RN  [facility]

## 2021-02-23 NOTE — TELEPHONE ENCOUNTER
Writing RN already sent another request to provider.  Closing this encounter to avoid duplication.     Clarisa Tesfaye RN  [facility]

## 2021-08-25 ENCOUNTER — PATIENT OUTREACH (OUTPATIENT)
Dept: FAMILY MEDICINE | Facility: CLINIC | Age: 56
End: 2021-08-25

## 2021-08-25 DIAGNOSIS — R87.810 CERVICAL HIGH RISK HPV (HUMAN PAPILLOMAVIRUS) TEST POSITIVE: ICD-10-CM

## 2021-08-25 NOTE — LETTER
August 25, 2021      Araceli Wilson  [id number]  [place] [facility] [place] [address]        Dear ,    At [facility], your health and wellness are our primary concern. That is why we are following up on your most recent positive high-risk Human Papillomavirus (HPV) test.    Please call [phone] to schedule an appointment for your recommended follow-up Pap smear and Human Papillomavirus (HPV) test at your earliest convenience.     If you have completed the appointment outside of the [facility] system, please have the records forwarded to our office. We will update your chart for your provider to review before your next annual wellness visit.     Thank you for choosing [facility]!      Sincerely,    Your [facility] Care Team

## 2021-10-27 NOTE — TELEPHONE ENCOUNTER
Dr. Angel,  Patient is lost to pap tracking follow-up. Attempts to contact pt have been made per reminder process and there has been no reply and/or no appt scheduled.    Aditi Odonnell RN  Pap Tracking     2003, 2004 NIL Paps  2005 NIL Pap  2006 NIL Pap  2007 NIL Pap  2009 NIL Pap  4/26/16 NIL Pap, + HR HPV   3/13/17 NIL Pap, + HR HPV   8/27/18  NIL Pap, neg HPV   All above from Care Everywhere    9/9/20 NIL Pap, + HR HPV (neg 16/18). Plan cotest in 1 year.   9/16/20  to send letter to pt.   8/25/21 Reminder letter  9/28/21 Reminder call- LM  10/27/21 Lost to follow-up for pap tracking

## 2022-06-24 ENCOUNTER — OFFICE VISIT (OUTPATIENT)
Dept: FAMILY MEDICINE | Facility: CLINIC | Age: 57
End: 2022-06-24
Payer: COMMERCIAL

## 2022-06-24 VITALS
WEIGHT: 147 LBS | BODY MASS INDEX: 23.07 KG/M2 | OXYGEN SATURATION: 98 % | RESPIRATION RATE: 16 BRPM | HEART RATE: 68 BPM | HEIGHT: 67 IN | DIASTOLIC BLOOD PRESSURE: 90 MMHG | TEMPERATURE: 97.6 F | SYSTOLIC BLOOD PRESSURE: 158 MMHG

## 2022-06-24 DIAGNOSIS — Z12.31 VISIT FOR SCREENING MAMMOGRAM: ICD-10-CM

## 2022-06-24 DIAGNOSIS — Z01.419 ENCOUNTER FOR GYNECOLOGICAL EXAMINATION WITHOUT ABNORMAL FINDING: Primary | ICD-10-CM

## 2022-06-24 DIAGNOSIS — Z11.4 SCREENING FOR HIV (HUMAN IMMUNODEFICIENCY VIRUS): ICD-10-CM

## 2022-06-24 DIAGNOSIS — Z12.4 CERVICAL CANCER SCREENING: ICD-10-CM

## 2022-06-24 DIAGNOSIS — I10 ESSENTIAL HYPERTENSION: ICD-10-CM

## 2022-06-24 DIAGNOSIS — K64.4 EXTERNAL HEMORRHOIDS: ICD-10-CM

## 2022-06-24 DIAGNOSIS — Z12.11 COLON CANCER SCREENING: ICD-10-CM

## 2022-06-24 LAB
ANION GAP SERPL CALCULATED.3IONS-SCNC: 2 MMOL/L (ref 3–14)
BUN SERPL-MCNC: 19 MG/DL (ref 7–30)
CALCIUM SERPL-MCNC: 8.7 MG/DL (ref 8.5–10.1)
CHLORIDE BLD-SCNC: 112 MMOL/L (ref 94–109)
CHOLEST SERPL-MCNC: 170 MG/DL
CO2 SERPL-SCNC: 29 MMOL/L (ref 20–32)
CREAT SERPL-MCNC: 0.67 MG/DL (ref 0.52–1.04)
FASTING STATUS PATIENT QL REPORTED: YES
GFR SERPL CREATININE-BSD FRML MDRD: >90 ML/MIN/1.73M2
GLUCOSE BLD-MCNC: 98 MG/DL (ref 70–99)
HDLC SERPL-MCNC: 66 MG/DL
HIV 1+2 AB+HIV1 P24 AG SERPL QL IA: NONREACTIVE
LDLC SERPL CALC-MCNC: 91 MG/DL
NONHDLC SERPL-MCNC: 104 MG/DL
POTASSIUM BLD-SCNC: 3.7 MMOL/L (ref 3.4–5.3)
SODIUM SERPL-SCNC: 143 MMOL/L (ref 133–144)
TRIGL SERPL-MCNC: 66 MG/DL

## 2022-06-24 PROCEDURE — 87389 HIV-1 AG W/HIV-1&-2 AB AG IA: CPT | Performed by: NURSE PRACTITIONER

## 2022-06-24 PROCEDURE — 36415 COLL VENOUS BLD VENIPUNCTURE: CPT | Performed by: NURSE PRACTITIONER

## 2022-06-24 PROCEDURE — 80048 BASIC METABOLIC PNL TOTAL CA: CPT | Performed by: NURSE PRACTITIONER

## 2022-06-24 PROCEDURE — 99396 PREV VISIT EST AGE 40-64: CPT | Performed by: NURSE PRACTITIONER

## 2022-06-24 PROCEDURE — 87624 HPV HI-RISK TYP POOLED RSLT: CPT | Performed by: NURSE PRACTITIONER

## 2022-06-24 PROCEDURE — 99214 OFFICE O/P EST MOD 30 MIN: CPT | Mod: 25 | Performed by: NURSE PRACTITIONER

## 2022-06-24 PROCEDURE — 80061 LIPID PANEL: CPT | Performed by: NURSE PRACTITIONER

## 2022-06-24 PROCEDURE — G0123 SCREEN CERV/VAG THIN LAYER: HCPCS | Performed by: NURSE PRACTITIONER

## 2022-06-24 RX ORDER — CHLORAL HYDRATE 500 MG
2 CAPSULE ORAL
COMMUNITY

## 2022-06-24 RX ORDER — LOSARTAN POTASSIUM 50 MG/1
50 TABLET ORAL DAILY
Qty: 90 TABLET | Refills: 3 | Status: SHIPPED | OUTPATIENT
Start: 2022-06-24 | End: 2023-05-23

## 2022-06-24 RX ORDER — HYDROCORTISONE 25 MG/G
CREAM TOPICAL 2 TIMES DAILY PRN
Qty: 30 G | Refills: 1 | Status: SHIPPED | OUTPATIENT
Start: 2022-06-24 | End: 2023-06-28

## 2022-06-24 ASSESSMENT — ENCOUNTER SYMPTOMS
HEMATURIA: 0
DIARRHEA: 0
CONSTIPATION: 0
ABDOMINAL PAIN: 0
EYE PAIN: 0
FREQUENCY: 0
PALPITATIONS: 0
HEADACHES: 0
JOINT SWELLING: 0
PARESTHESIAS: 0
SORE THROAT: 0
WEAKNESS: 0
DIZZINESS: 0
ARTHRALGIAS: 0
SHORTNESS OF BREATH: 0
HEARTBURN: 0
FEVER: 0
MYALGIAS: 0
NERVOUS/ANXIOUS: 0
CHILLS: 0
HEMATOCHEZIA: 0
DYSURIA: 0
COUGH: 0
BREAST MASS: 0
NAUSEA: 0

## 2022-06-24 ASSESSMENT — PAIN SCALES - GENERAL: PAINLEVEL: NO PAIN (0)

## 2022-06-24 NOTE — PROGRESS NOTES
SUBJECTIVE:   CC: Araceli Wilson is an 56 year old woman who presents for preventive health visit.       Patient has been advised of split billing requirements and indicates understanding: Yes  Healthy Habits:     Getting at least 3 servings of Calcium per day:  NO    Bi-annual eye exam:  NO    Dental care twice a year:  NO    Sleep apnea or symptoms of sleep apnea:  None    Diet:  Regular (no restrictions)    Frequency of exercise:  1 day/week    Duration of exercise:  Less than 15 minutes    Taking medications regularly:  Yes    Medication side effects:  None    PHQ-2 Total Score: 0    Additional concerns today:  No        PROBLEMS TO ADD ON...  Hypertension Follow-up  HASN'T TAKEN medication for 3-4 months    Do you check your blood pressure regularly outside of the clinic? No     Are you following a low salt diet? No    Are your blood pressures ever more than 140 on the top number (systolic) OR more   than 90 on the bottom number (diastolic), for example 140/90?  DOESN'T CHECK      External hemorrhoids:  Asking for a prescription for hemorrhoid cream.  Itchy.  Never painful or bleeding.        Today's PHQ-2 Score:   PHQ-2 ( 1999 Pfizer) 6/24/2022   Q1: Little interest or pleasure in doing things 0   Q2: Feeling down, depressed or hopeless 0   PHQ-2 Score 0   PHQ-2 Total Score (12-17 Years)- Positive if 3 or more points; Administer PHQ-A if positive -   Q1: Little interest or pleasure in doing things Not at all   Q2: Feeling down, depressed or hopeless Not at all   PHQ-2 Score 0       Abuse: Current or Past (Physical, Sexual or Emotional) - No  Do you feel safe in your environment? Yes        Social History     Tobacco Use     Smoking status: Light Tobacco Smoker     Types: Cigarettes     Smokeless tobacco: Never Used     Tobacco comment: Social- 5-8 in a week.   Substance Use Topics     Alcohol use: Yes     Comment: social         Alcohol Use 6/24/2022   Prescreen: >3 drinks/day or >7 drinks/week? No        Reviewed orders with patient.  Reviewed health maintenance and updated orders accordingly - Yes      Breast Cancer Screening:    FHS-7:   Breast CA Risk Assessment (FHS-7) 6/24/2022   Did any of your first-degree relatives have breast or ovarian cancer? Yes   Did any of your relatives have bilateral breast cancer? Unknown   Did any man in your family have breast cancer? No   Did any woman in your family have breast and ovarian cancer? Yes   Did any woman in your family have breast cancer before age 50 y? No   Do you have 2 or more relatives with breast and/or ovarian cancer? No   Do you have 2 or more relatives with breast and/or bowel cancer? No       Mammogram Screening: Recommended annual mammography  Pertinent mammograms are reviewed under the imaging tab.    History of abnormal Pap smear: YES - updated in Problem List and Health Maintenance accordingly  PAP / HPV Latest Ref Rng & Units 9/9/2020   PAP (Historical) - NIL   HPV16 NEG:Negative Negative   HPV18 NEG:Negative Negative   HRHPV NEG:Negative Positive(A)     Reviewed and updated as needed this visit by clinical staff   Tobacco  Allergies  Meds   Med Hx  Surg Hx  Fam Hx  Soc Hx          Reviewed and updated as needed this visit by Provider                       Review of Systems   Constitutional: Negative for chills and fever.   HENT: Negative for congestion, ear pain, hearing loss and sore throat.    Eyes: Negative for pain and visual disturbance.   Respiratory: Negative for cough and shortness of breath.    Cardiovascular: Negative for chest pain, palpitations and peripheral edema.   Gastrointestinal: Negative for abdominal pain, constipation, diarrhea, heartburn, hematochezia and nausea.   Breasts:  Negative for tenderness, breast mass and discharge.   Genitourinary: Negative for dysuria, frequency, genital sores, hematuria, pelvic pain, urgency, vaginal bleeding and vaginal discharge.   Musculoskeletal: Negative for arthralgias, joint  "swelling and myalgias.   Skin: Negative for rash.   Neurological: Negative for dizziness, weakness, headaches and paresthesias.   Psychiatric/Behavioral: Negative for mood changes. The patient is not nervous/anxious.           OBJECTIVE:   BP (!) 158/90 (BP Location: Right arm, Patient Position: Sitting, Cuff Size: Adult Regular)   Pulse 68   Temp 97.6  F (36.4  C) (Tympanic)   Resp 16   Ht 1.689 m (5' 6.5\")   Wt 66.7 kg (147 lb)   SpO2 98%   BMI 23.37 kg/m    Physical Exam  GENERAL APPEARANCE: healthy, alert and no distress  EYES: Eyes grossly normal to inspection, PERRL and conjunctivae and sclerae normal  HENT: ear canals and TM's normal, nose and mouth without ulcers or lesions, oropharynx clear and oral mucous membranes moist  NECK: no adenopathy, no asymmetry, masses, or scars and thyroid normal to palpation  RESP: lungs clear to auscultation - no rales, rhonchi or wheezes  BREAST: normal without masses, tenderness or nipple discharge and no palpable axillary masses or adenopathy  CV: regular rate and rhythm, normal S1 S2, no S3 or S4, no murmur, click or rub, no peripheral edema and peripheral pulses strong  ABDOMEN: soft, nontender, no hepatosplenomegaly, no masses and bowel sounds normal   (female): normal female external genitalia, normal urethral meatus, vaginal mucosal atrophy noted, normal cervix, adnexae, and uterus without masses or abnormal discharge  MS: no musculoskeletal defects are noted and gait is age appropriate without ataxia  SKIN: no suspicious lesions or rashes  NEURO: Normal strength and tone, sensory exam grossly normal, mentation intact and speech normal  PSYCH: mentation appears normal and affect normal/bright        ASSESSMENT/PLAN:       ICD-10-CM    1. Encounter for gynecological examination without abnormal finding  Z01.419 Lipid panel reflex to direct LDL Fasting     Lipid panel reflex to direct LDL Fasting     2. Essential hypertension  I10 Poorly controlled off " "medications.  Restart losartan (COZAAR) 50 MG tablet     Basic metabolic panel  (Ca, Cl, CO2, Creat, Gluc, K, Na, BUN)     Basic metabolic panel  (Ca, Cl, CO2, Creat, Gluc, K, Na, BUN)     OFFICE/OUTPT VISIT,EST,LEVL III     3. External hemorrhoids  K64.4 hydrocortisone, Perianal, (HYDROCORTISONE) 2.5 % cream     OFFICE/OUTPT VISIT,EST,LEVL III     4. Screening for HIV (human immunodeficiency virus)  Z11.4 HIV Antigen Antibody Combo     HIV Antigen Antibody Combo   5. Visit for screening mammogram  Z12.31 MA Screen Bilateral w/Asa   6. Cervical cancer screening  Z12.4 Pap Screen with HPV - recommended age 30 - 65 years   7. Colon cancer screening  Z12.11 Colonscopy Screening  Referral       Patient has been advised of split billing requirements and indicates understanding: Yes by AFR and CMA    COUNSELING:  Reviewed preventive health counseling, as reflected in patient instructions    Estimated body mass index is 23.37 kg/m  as calculated from the following:    Height as of this encounter: 1.689 m (5' 6.5\").    Weight as of this encounter: 66.7 kg (147 lb).    The risks, benefits and treatment options of prescribed medications or other treatments have been discussed with the patient. The patient verbalized their understanding and should call or follow up if no improvement or if they develop further problems.    SONIDO Saunders [facility]  "

## 2022-06-24 NOTE — LETTER
June 27, 2022      Araceli TRENT Katie  [address] [place] [address]        Dear ,    We are writing to inform you of your test results.      HIV screening test was negative.     Resulted Orders   Lipid panel reflex to direct LDL Fasting   Result Value Ref Range    Cholesterol 170 <200 mg/dL    Triglycerides 66 <150 mg/dL    Direct Measure HDL 66 >=50 mg/dL    LDL Cholesterol Calculated 91 <=100 mg/dL    Non HDL Cholesterol 104 <130 mg/dL    Patient Fasting > 8hrs? Yes     Narrative    Cholesterol  Desirable:  <200 mg/dL    Triglycerides  Normal:  Less than 150 mg/dL  Borderline High:  150-199 mg/dL  High:  200-499 mg/dL  Very High:  Greater than or equal to 500 mg/dL    Direct Measure HDL  Female:  Greater than or equal to 50 mg/dL   Male:  Greater than or equal to 40 mg/dL    LDL Cholesterol  Desirable:  <100mg/dL  Above Desirable:  100-129 mg/dL   Borderline High:  130-159 mg/dL   High:  160-189 mg/dL   Very High:  >= 190 mg/dL    Non HDL Cholesterol  Desirable:  130 mg/dL  Above Desirable:  130-159 mg/dL  Borderline High:  160-189 mg/dL  High:  190-219 mg/dL  Very High:  Greater than or equal to 220 mg/dL   Basic metabolic panel  (Ca, Cl, CO2, Creat, Gluc, K, Na, BUN)   Result Value Ref Range    Sodium 143 133 - 144 mmol/L    Potassium 3.7 3.4 - 5.3 mmol/L    Chloride 112 (H) 94 - 109 mmol/L    Carbon Dioxide (CO2) 29 20 - 32 mmol/L    Anion Gap 2 (L) 3 - 14 mmol/L    Urea Nitrogen 19 7 - 30 mg/dL    Creatinine 0.67 0.52 - 1.04 mg/dL    Calcium 8.7 8.5 - 10.1 mg/dL    Glucose 98 70 - 99 mg/dL    GFR Estimate >90 >60 mL/min/1.73m2      Comment:      Effective December 21, 2021 eGFRcr in adults is calculated using the 2021 CKD-EPI creatinine equation which includes age and gender (Carlos brush al., NEJ, DOI: 10.1056/ZJYBgc5320899)   HIV Antigen Antibody Combo   Result Value Ref Range    HIV Antigen Antibody Combo Nonreactive Nonreactive      Comment:      HIV-1 p24 Ag & HIV-1/HIV-2 Ab Not Detected        If you have any questions or concerns, please call the clinic at the number listed above.       Sincerely,      SONIDO Saunders CNP

## 2022-06-24 NOTE — LETTER
June 24, 2022      Araceli TRENT [place]  [address] [place] [address]        Dear ,    We are writing to inform you of your test results.    Cholesterol is very good.   It has improved compared to last check 1 year ago.     Kidney function, electrolytes and blood sugar were all normal      Resulted Orders   Lipid panel reflex to direct LDL Fasting   Result Value Ref Range    Cholesterol 170 <200 mg/dL    Triglycerides 66 <150 mg/dL    Direct Measure HDL 66 >=50 mg/dL    LDL Cholesterol Calculated 91 <=100 mg/dL    Non HDL Cholesterol 104 <130 mg/dL    Patient Fasting > 8hrs? Yes     Narrative    Cholesterol  Desirable:  <200 mg/dL    Triglycerides  Normal:  Less than 150 mg/dL  Borderline High:  150-199 mg/dL  High:  200-499 mg/dL  Very High:  Greater than or equal to 500 mg/dL    Direct Measure HDL  Female:  Greater than or equal to 50 mg/dL   Male:  Greater than or equal to 40 mg/dL    LDL Cholesterol  Desirable:  <100mg/dL  Above Desirable:  100-129 mg/dL   Borderline High:  130-159 mg/dL   High:  160-189 mg/dL   Very High:  >= 190 mg/dL    Non HDL Cholesterol  Desirable:  130 mg/dL  Above Desirable:  130-159 mg/dL  Borderline High:  160-189 mg/dL  High:  190-219 mg/dL  Very High:  Greater than or equal to 220 mg/dL   Basic metabolic panel  (Ca, Cl, CO2, Creat, Gluc, K, Na, BUN)   Result Value Ref Range    Sodium 143 133 - 144 mmol/L    Potassium 3.7 3.4 - 5.3 mmol/L    Chloride 112 (H) 94 - 109 mmol/L    Carbon Dioxide (CO2) 29 20 - 32 mmol/L    Anion Gap 2 (L) 3 - 14 mmol/L    Urea Nitrogen 19 7 - 30 mg/dL    Creatinine 0.67 0.52 - 1.04 mg/dL    Calcium 8.7 8.5 - 10.1 mg/dL    Glucose 98 70 - 99 mg/dL    GFR Estimate >90 >60 mL/min/1.73m2      Comment:      Effective December 21, 2021 eGFRcr in adults is calculated using the 2021 CKD-EPI creatinine equation which includes age and gender (Carlos brush al., NEJM, DOI: 10.1056/RAPMtr5162720)       If you have any questions or concerns, please call the  clinic at the number listed above.       Sincerely,      SONIDO Saunders CNP

## 2022-06-28 LAB
BKR LAB AP GYN ADEQUACY: NORMAL
BKR LAB AP GYN INTERPRETATION: NORMAL
BKR LAB AP HPV REFLEX: NORMAL
BKR LAB AP PREVIOUS ABNL DX: NORMAL
BKR LAB AP PREVIOUS ABNORMAL: NORMAL
PATH REPORT.COMMENTS IMP SPEC: NORMAL
PATH REPORT.COMMENTS IMP SPEC: NORMAL
PATH REPORT.RELEVANT HX SPEC: NORMAL

## 2022-06-30 ENCOUNTER — PATIENT OUTREACH (OUTPATIENT)
Dept: FAMILY MEDICINE | Facility: CLINIC | Age: 57
End: 2022-06-30

## 2022-06-30 LAB
HUMAN PAPILLOMA VIRUS 16 DNA: NEGATIVE
HUMAN PAPILLOMA VIRUS 18 DNA: NEGATIVE
HUMAN PAPILLOMA VIRUS FINAL DIAGNOSIS: NORMAL
HUMAN PAPILLOMA VIRUS OTHER HR: NEGATIVE

## 2022-06-30 NOTE — LETTER
June 30, 2022      Araceli Wilson  [address]  [facility] [place] [address]        Dear ,    We are happy to inform you that your recent Pap smear and Human Papillomavirus (HPV) test results are normal and negative.    It is recommended that you have your next Pap smear and Human Papillomavirus (HPV) test in 1 year. You will also need to return to the clinic every year for an annual wellness visit.    If you have additional questions regarding this result, please contact our office and we will be happy to assist you.      Sincerely,    Your [facility] Care Team

## 2022-10-31 ENCOUNTER — TELEPHONE (OUTPATIENT)
Dept: FAMILY MEDICINE | Facility: CLINIC | Age: 57
End: 2022-10-31

## 2022-10-31 NOTE — LETTER
October 31, 2022      Araceli TRENT [facility]  [address]  [facility] [facility] [address]        Dear Araceli,     If you have completed these tests at another facility, please call your clinic with the details about when, where, and the results, or have your records sent to our clinic so that we can best coordinate your care.     You are in particular need of attention regarding the following: Mammogram and Colon Cancer Screening Test .     Schedule Annual MAMMOGRAPHY. [facility] scheduling number is [phone] or schedule in [facility] (self referral).  Call or MyChart message your clinic to schedule a colonoscopy, schedule/ a FIT Test, or order a Cologuard test. If you are unsure what type of test you need, please call your clinic and speak to clinic staff.   Colon cancer is now the second leading cause of cancer-related deaths in the [place] for both men and women and there are over 130,000 new cases and 50,000 deaths per year from colon cancer. Colonoscopies can prevent 90-95% of these deaths. Problem lesions can be removed before they ever become cancer. This test is not only looking for cancer, but also getting rid of precancerous lesions.   If you are under/uninsured, we recommend you contact the [employer] Program.[employer] is a free colorectal cancer screening program that provides colonoscopies for eligible under/uninsured [place] men and women. If you are interested in receiving a free colonoscopy, please call "[employer] at t [phone] (mention code [employer]) to see if you're eligible. Please have them send us the results.     If you have already completed these items, please contact the clinic via phone or   [facility] so your care team can review and update your records. Thank you for   choosing [facility] for your healthcare needs. For any questions,   concerns, or to schedule an appointment please contact our clinic.    Healthy Regards,      Your [facility]  Team

## 2022-10-31 NOTE — TELEPHONE ENCOUNTER
Patient Quality Outreach    Patient is due for the following:   Colon Cancer Screening  Breast Cancer Screening - Mammogram    Next Steps:   due for mammogram and colon cancer screen     Type of outreach:    Sent letter.      Questions for provider review:    None     Shankar Schmidt, CMA

## 2023-01-30 ENCOUNTER — TELEPHONE (OUTPATIENT)
Dept: FAMILY MEDICINE | Facility: CLINIC | Age: 58
End: 2023-01-30
Payer: COMMERCIAL

## 2023-01-30 NOTE — LETTER
January 30, 2023      Araceli TRENT [facility]  [address]  [facility] [place] [address]        Dear Araceli,     Your team at [facility] cares about your health. We have reviewed your chart and based on our findings; we are making the following recommendations to better manage your health.     You are in particular need of attention regarding the following:     Call or MyChart message your clinic to schedule a colonoscopy, schedule/ a FIT Test, or order a Cologuard test. If you are unsure what type of test you need, please call your clinic and speak to clinic staff.   Colon cancer is now the second leading cause of cancer-related deaths in the [place] for both men and women and there are over 130,000 new cases and 50,000 deaths per year from colon cancer. Colonoscopies can prevent 90-95% of these deaths. Problem lesions can be removed before they ever become cancer. This test is not only looking for cancer, but also getting rid of precancerous lesions.   If you are under/uninsured, we recommend you contact the [employer] Program.[employer] is a free colorectal cancer screening program that provides colonoscopies for eligible under/uninsured [place] men and women. If you are interested in receiving a free colonoscopy, please call [employer] at t [phone] (mention code [employer]) to see if you're eligible. Please have them send us the results.   OTHER FOLLOW UP: Mammogram is due . Please call [phone] to schedule.     If you have already completed these items, please contact the clinic via phone or   [employer] so your care team can review and update your records. Thank you for   choosing [facility] for your healthcare needs. For any questions,   concerns, or to schedule an appointment please contact our clinic.    Healthy Regards,      Your [facility] Care Team

## 2023-01-30 NOTE — TELEPHONE ENCOUNTER
Patient Quality Outreach    Patient is due for the following:   Colon Cancer Screening  Breast Cancer Screening - Mammogram    Next Steps:   Due for colon cancer screena and mammogram     Type of outreach:    Sent letter.      Questions for provider review:    None     Shankar Schmidt, CMA

## 2023-05-04 ENCOUNTER — TELEPHONE (OUTPATIENT)
Dept: FAMILY MEDICINE | Facility: CLINIC | Age: 58
End: 2023-05-04
Payer: COMMERCIAL

## 2023-05-04 NOTE — TELEPHONE ENCOUNTER
Patient Quality Outreach    Patient is due for the following:   Colon Cancer Screening  Breast Cancer Screening - Mammogram  Cervical Cancer Screening - PAP Needed- June 2023  Physical Preventive Adult Physical    Next Steps:   Schedule a Adult Preventative- June 2023    Type of outreach:    Sent letter.      Questions for provider review:    None           Mikayla Frias, [facility]

## 2023-05-04 NOTE — LETTER
May 4, 2023    To  Araceli TRENT [facility]  [address]  [facility] [place] [address]    Your team at [facility] cares about your health. We have reviewed your chart and based on our findings; we are making the following recommendations to better manage your health.     You are in particular need of attention regarding the following:     Schedule Annual MAMMOGRAPHY. [facility] scheduling number is [phone] or schedule in MyChart (self referral).  1 in 8 women will develop invasive breast cancer during her lifetime and it is the most common non-skin cancer in [place] Women. EARLY detection, new treatments, and a better understanding of the disease have increased survival rates- the 5 year survival rate in the 1960's was 63% and today it is close to 90%.  Schedule a primary care office visit with your provider for a Pap Smear to screen for Cervical Cancer.  Call or MyChart message your clinic to schedule a colonoscopy, schedule/ a FIT Test, or order a Cologuard test. If you are unsure what type of test you need, please call your clinic and speak to clinic staff.   Colon cancer is now the second leading cause of cancer-related deaths in the [place] for both men and women and there are over 130,000 new cases and 50,000 deaths per year from colon cancer. Colonoscopies can prevent 90-95% of these deaths. Problem lesions can be removed before they ever become cancer. This test is not only looking for cancer, but also getting rid of precancerous lesions.   PREVENTATIVE VISIT: Physical-  pap smear and physical due in June 2023    If you have already completed these items, please contact the clinic via phone or   [employer] so your care team can review and update your records. Thank you for   choosing [facility] for your healthcare needs. For any questions,   concerns, or to schedule an appointment please contact our clinic.    Healthy Regards,      Your [facility]  Team

## 2023-06-01 ENCOUNTER — PATIENT OUTREACH (OUTPATIENT)
Dept: FAMILY MEDICINE | Facility: CLINIC | Age: 58
End: 2023-06-01
Payer: COMMERCIAL

## 2023-06-01 PROBLEM — R87.810 CERVICAL HIGH RISK HPV (HUMAN PAPILLOMAVIRUS) TEST POSITIVE: Status: ACTIVE | Noted: 2018-07-05

## 2023-06-01 NOTE — LETTER
June 1, 2023      Araceli Wilson  [address]  [place] [address]        Dear ,    This letter is to remind you that you are due for your follow-up Pap smear and Human Papillomavirus (HPV) test.    Please call [phone] to schedule your appointment at your earliest convenience.    If you have completed the appointment outside of the [facility] system, please have the records forwarded to our office. We will update your chart for your provider to review before your next annual wellness visit.     Thank you for choosing [facility]!      Sincerely,    Your [facility] Care Team

## 2023-06-26 ENCOUNTER — DOCUMENTATION ONLY (OUTPATIENT)
Dept: LAB | Facility: CLINIC | Age: 58
End: 2023-06-26
Payer: COMMERCIAL

## 2023-06-26 DIAGNOSIS — I10 ESSENTIAL HYPERTENSION: Primary | ICD-10-CM

## 2023-06-26 DIAGNOSIS — Z13.220 SCREENING FOR LIPID DISORDERS: ICD-10-CM

## 2023-06-26 NOTE — PROGRESS NOTES
The patient has a lab appointment on 6/28/2023 and there are no lab order. Please place a lab order. Thank you lab!

## 2023-06-28 ENCOUNTER — HOSPITAL ENCOUNTER (OUTPATIENT)
Dept: MAMMOGRAPHY | Facility: CLINIC | Age: 58
Discharge: HOME | End: 2023-06-28
Attending: NURSE PRACTITIONER | Admitting: NURSE PRACTITIONER
Payer: COMMERCIAL

## 2023-06-28 ENCOUNTER — OFFICE VISIT (OUTPATIENT)
Dept: FAMILY MEDICINE | Facility: CLINIC | Age: 58
End: 2023-06-28
Payer: COMMERCIAL

## 2023-06-28 VITALS
BODY MASS INDEX: 24.48 KG/M2 | DIASTOLIC BLOOD PRESSURE: 82 MMHG | HEIGHT: 67 IN | RESPIRATION RATE: 16 BRPM | OXYGEN SATURATION: 97 % | TEMPERATURE: 97.7 F | WEIGHT: 156 LBS | SYSTOLIC BLOOD PRESSURE: 150 MMHG | HEART RATE: 72 BPM

## 2023-06-28 DIAGNOSIS — K64.4 EXTERNAL HEMORRHOIDS: ICD-10-CM

## 2023-06-28 DIAGNOSIS — Z13.220 SCREENING FOR LIPID DISORDERS: ICD-10-CM

## 2023-06-28 DIAGNOSIS — Z01.419 ENCOUNTER FOR GYNECOLOGICAL EXAMINATION WITHOUT ABNORMAL FINDING: Primary | ICD-10-CM

## 2023-06-28 DIAGNOSIS — Z12.31 VISIT FOR SCREENING MAMMOGRAM: ICD-10-CM

## 2023-06-28 DIAGNOSIS — Z12.4 CERVICAL CANCER SCREENING: ICD-10-CM

## 2023-06-28 DIAGNOSIS — Z12.11 SCREEN FOR COLON CANCER: ICD-10-CM

## 2023-06-28 DIAGNOSIS — I10 ESSENTIAL HYPERTENSION: ICD-10-CM

## 2023-06-28 LAB
ANION GAP SERPL CALCULATED.3IONS-SCNC: 7 MMOL/L (ref 7–15)
BUN SERPL-MCNC: 15.2 MG/DL (ref 8–23)
CALCIUM SERPL-MCNC: 9.4 MG/DL (ref 8.6–10)
CHLORIDE SERPL-SCNC: 108 MMOL/L (ref 98–107)
CHOLEST SERPL-MCNC: 203 MG/DL
CREAT SERPL-MCNC: 0.75 MG/DL (ref 0.51–0.95)
DEPRECATED HCO3 PLAS-SCNC: 27 MMOL/L (ref 22–29)
GFR SERPL CREATININE-BSD FRML MDRD: >90 ML/MIN/1.73M2
GLUCOSE SERPL-MCNC: 101 MG/DL (ref 70–99)
HDLC SERPL-MCNC: 73 MG/DL
LDLC SERPL CALC-MCNC: 107 MG/DL
NONHDLC SERPL-MCNC: 130 MG/DL
POTASSIUM SERPL-SCNC: 3.9 MMOL/L (ref 3.4–5.3)
SODIUM SERPL-SCNC: 142 MMOL/L (ref 136–145)
TRIGL SERPL-MCNC: 117 MG/DL

## 2023-06-28 PROCEDURE — 36415 COLL VENOUS BLD VENIPUNCTURE: CPT | Performed by: NURSE PRACTITIONER

## 2023-06-28 PROCEDURE — 80061 LIPID PANEL: CPT | Performed by: NURSE PRACTITIONER

## 2023-06-28 PROCEDURE — G0145 SCR C/V CYTO,THINLAYER,RESCR: HCPCS | Performed by: NURSE PRACTITIONER

## 2023-06-28 PROCEDURE — 80048 BASIC METABOLIC PNL TOTAL CA: CPT | Performed by: NURSE PRACTITIONER

## 2023-06-28 PROCEDURE — 99213 OFFICE O/P EST LOW 20 MIN: CPT | Mod: 25 | Performed by: NURSE PRACTITIONER

## 2023-06-28 PROCEDURE — 77067 SCR MAMMO BI INCL CAD: CPT

## 2023-06-28 PROCEDURE — 99396 PREV VISIT EST AGE 40-64: CPT | Performed by: NURSE PRACTITIONER

## 2023-06-28 PROCEDURE — 87624 HPV HI-RISK TYP POOLED RSLT: CPT | Performed by: NURSE PRACTITIONER

## 2023-06-28 RX ORDER — LOSARTAN POTASSIUM 100 MG/1
100 TABLET ORAL DAILY
Qty: 90 TABLET | Refills: 3 | Status: SHIPPED | OUTPATIENT
Start: 2023-06-28 | End: 2024-07-05

## 2023-06-28 RX ORDER — HYDROCORTISONE 25 MG/G
CREAM TOPICAL 2 TIMES DAILY PRN
Qty: 30 G | Refills: 11 | Status: SHIPPED | OUTPATIENT
Start: 2023-06-28 | End: 2024-10-03

## 2023-06-28 RX ORDER — LOSARTAN POTASSIUM 50 MG/1
50 TABLET ORAL DAILY
Qty: 30 TABLET | Refills: 0 | Status: CANCELLED | OUTPATIENT
Start: 2023-06-28

## 2023-06-28 ASSESSMENT — ENCOUNTER SYMPTOMS
ABDOMINAL PAIN: 0
HEMATURIA: 0
COUGH: 0
CHILLS: 0
HEMATOCHEZIA: 0
CONSTIPATION: 0

## 2023-06-28 ASSESSMENT — PAIN SCALES - GENERAL: PAINLEVEL: NO PAIN (0)

## 2023-06-28 NOTE — LETTER
June 28, 2023      Araceli TRENT [place]  [address]  [place] [address]        Dear ,    We are writing to inform you of your test results.    Your kidney function and electrolytes are normal.     Your cholesterol is mildly elevated.  I recommend working on a low-cholesterol, low saturated fat diet.  I also recommend smoking cessation.   We should recheck your labs in 1 year.  If your cholesterol is not decreasing, we may need to discuss medication.   Azalia Eckert CNP     Resulted Orders   Lipid panel reflex to direct LDL Fasting   Result Value Ref Range    Cholesterol 203 (H) <200 mg/dL    Triglycerides 117 <150 mg/dL    Direct Measure HDL 73 >=50 mg/dL    LDL Cholesterol Calculated 107 (H) <=100 mg/dL    Non HDL Cholesterol 130 (H) <130 mg/dL    Narrative    Cholesterol  Desirable:  <200 mg/dL    Triglycerides  Normal:  Less than 150 mg/dL  Borderline High:  150-199 mg/dL  High:  200-499 mg/dL  Very High:  Greater than or equal to 500 mg/dL    Direct Measure HDL  Female:  Greater than or equal to 50 mg/dL   Male:  Greater than or equal to 40 mg/dL    LDL Cholesterol  Desirable:  <100mg/dL  Above Desirable:  100-129 mg/dL   Borderline High:  130-159 mg/dL   High:  160-189 mg/dL   Very High:  >= 190 mg/dL    Non HDL Cholesterol  Desirable:  130 mg/dL  Above Desirable:  130-159 mg/dL  Borderline High:  160-189 mg/dL  High:  190-219 mg/dL  Very High:  Greater than or equal to 220 mg/dL   Basic metabolic panel  (Ca, Cl, CO2, Creat, Gluc, K, Na, BUN)   Result Value Ref Range    Sodium 142 136 - 145 mmol/L    Potassium 3.9 3.4 - 5.3 mmol/L    Chloride 108 (H) 98 - 107 mmol/L    Carbon Dioxide (CO2) 27 22 - 29 mmol/L    Anion Gap 7 7 - 15 mmol/L    Urea Nitrogen 15.2 8.0 - 23.0 mg/dL    Creatinine 0.75 0.51 - 0.95 mg/dL    Calcium 9.4 8.6 - 10.0 mg/dL    Glucose 101 (H) 70 - 99 mg/dL    GFR Estimate >90 >60 mL/min/1.73m2       If you have any questions or concerns, please call the clinic at the number  listed above.       Sincerely,      SONIDO Saunders CNP

## 2023-06-28 NOTE — PROGRESS NOTES
"   SUBJECTIVE:   CC: Araceli is an 57 year old who presents for preventive health visit.       6/28/2023     9:03 AM   Additional Questions   Roomed by Mikayla TURPIN     Healthy Habits:     Getting at least 3 servings of Calcium per day:  NO    Bi-annual eye exam:  NO    Dental care twice a year:  NO    Sleep apnea or symptoms of sleep apnea:  None    Diet:  Regular (no restrictions)    Frequency of exercise:  2-3 days/week    Duration of exercise:  Less than 15 minutes    Taking medications regularly:  Yes    Medication side effects:  None    PHQ-2 Total Score: 0    Additional concerns today:  No        PROBLEMS TO ADD ON...    Wants blood work done-  \"for everything\"      Hypertension Follow-up      Do you check your blood pressure regularly outside of the clinic? No     Are you following a low salt diet? No    Are your blood pressures ever more than 140 on the top number (systolic) OR more   than 90 on the bottom number (diastolic), for example 140/90?  Doesn't check      Today's PHQ-2 Score:       6/28/2023     9:02 AM   PHQ-2 ( 1999 [employer])   Q1: Little interest or pleasure in doing things 0   Q2: Feeling down, depressed or hopeless 0   PHQ-2 Score 0   Q1: Little interest or pleasure in doing things Not at all   Q2: Feeling down, depressed or hopeless Not at all   PHQ-2 Score 0           Social History     Tobacco Use     Smoking status: Light Smoker     Types: Cigarettes     Smokeless tobacco: Never     Tobacco comments:     Social- 5-8 in a week.   Substance Use Topics     Alcohol use: Yes     Comment: social             6/28/2023     9:01 AM   Alcohol Use   Prescreen: >3 drinks/day or >7 drinks/week? No     Reviewed orders with patient.  Reviewed health maintenance and updated orders accordingly - Yes      Breast Cancer Screening:    FHS-7:       6/24/2022     9:57 AM 6/28/2023     9:02 AM   Breast CA Risk Assessment (FHS-7)   Did any of your first-degree relatives have breast or ovarian cancer? Yes Yes   Did any of " "your relatives have bilateral breast cancer? Unknown Unknown   Did any man in your family have breast cancer? No    Did any woman in your family have breast and ovarian cancer? Yes    Did any woman in your family have breast cancer before age 50 y? No    Do you have 2 or more relatives with breast and/or ovarian cancer? No    Do you have 2 or more relatives with breast and/or bowel cancer? No        Mammogram Screening: Recommended annual mammography  Pertinent mammograms are reviewed under the imaging tab.    History of abnormal Pap smear: YES - updated in Problem List and Health Maintenance accordingly      Latest Ref Rng & Units 6/24/2022    10:40 AM 9/9/2020    10:30 AM 9/9/2020    10:25 AM   PAP / HPV   PAP  Negative for Intraepithelial Lesion or Malignancy (NILM)      PAP (Historical)    NIL    HPV 16 DNA Negative Negative  Negative     HPV 18 DNA Negative Negative  Negative     Other HR HPV Negative Negative  Positive       Reviewed and updated as needed this visit by clinical staff                  Reviewed and updated as needed this visit by Provider                     Review of Systems   Constitutional: Negative for chills.   HENT: Negative for congestion.    Respiratory: Negative for cough.    Cardiovascular: Negative for chest pain.   Gastrointestinal: Negative for abdominal pain, constipation and hematochezia.   Genitourinary: Negative for hematuria.          OBJECTIVE:   BP (!) 150/82 (BP Location: Right arm, Cuff Size: Adult Regular)   Pulse 72   Temp 97.7  F (36.5  C) (Tympanic)   Resp 16   Ht 1.689 m (5' 6.5\")   Wt 70.8 kg (156 lb)   LMP  (LMP Unknown)   SpO2 97%   BMI 24.80 kg/m    Physical Exam  GENERAL APPEARANCE: healthy, alert and no distress  EYES: Eyes grossly normal to inspection, PERRL and conjunctivae and sclerae normal  HENT: ear canals and TM's normal, nose and mouth without ulcers or lesions, oropharynx clear and oral mucous membranes moist  NECK: no adenopathy, no asymmetry, " masses, or scars and thyroid normal to palpation  RESP: lungs clear to auscultation - no rales, rhonchi or wheezes  BREAST: normal without masses, tenderness or nipple discharge and no palpable axillary masses or adenopathy  CV: regular rate and rhythm, normal S1 S2, no S3 or S4, no murmur, click or rub, no peripheral edema and peripheral pulses strong  ABDOMEN: soft, nontender, no hepatosplenomegaly, no masses and bowel sounds normal   (female): normal female external genitalia, normal urethral meatus, vaginal mucosal atrophy noted, normal cervix, adnexae, and uterus without masses or abnormal discharge  MS: no musculoskeletal defects are noted and gait is age appropriate without ataxia  SKIN: no suspicious lesions or rashes  NEURO: Normal strength and tone, sensory exam grossly normal, mentation intact and speech normal  PSYCH: mentation appears normal and affect normal/bright        ASSESSMENT/PLAN:       ICD-10-CM    1. Encounter for gynecological examination without abnormal finding  Z01.419       2. Essential hypertension  I10 Poorly controlled.  Increase losartan to 100 mg daily  Recheck BP in one month.  losartan (COZAAR) 100 MG tablet     Basic metabolic panel  (Ca, Cl, CO2, Creat, Gluc, K, Na, BUN)      3. External hemorrhoids  K64.4 Refilled:  hydrocortisone, Perianal, (HYDROCORTISONE) 2.5 % cream      4. Screen for colon cancer  Z12.11 Colonoscopy Screening  Referral      5. Cervical cancer screening  Z12.4 Pap Screen with HPV - recommended age 30 - 65 years      6. Screening for lipid disorders  Z13.220 Lipid panel reflex to direct LDL Fasting          Patient has been advised of split billing requirements and indicates understanding: Yes by AFR and CMA      COUNSELING:  Reviewed preventive health counseling, as reflected in patient instructions        She reports that she has been smoking cigarettes. She has never used smokeless tobacco.      The risks, benefits and treatment options of  prescribed medications or other treatments have been discussed with the patient. The patient verbalized their understanding and should call or follow up if no improvement or if they develop further problems.    SONIDO Saunders [facility]

## 2023-08-07 ENCOUNTER — TELEPHONE (OUTPATIENT)
Dept: FAMILY MEDICINE | Facility: CLINIC | Age: 58
End: 2023-08-07
Payer: COMMERCIAL

## 2023-08-07 NOTE — TELEPHONE ENCOUNTER
Blood pressure was elevated at last clinic visit. It is important to maintain a blood pressure <140/90.  Please call patient and ask them to:      Come in for a free RN appointment to recheck blood pressure    Azalia Eckert CNP

## 2023-08-08 NOTE — TELEPHONE ENCOUNTER
Patient Quality Outreach    Patient is due for the following:   Hypertension -  BP check    Next Steps:   Schedule a nurse only visit for BP    Type of outreach:    Phone, left message for patient/parent to call back.      Questions for provider review:    None           Shankar Schmidt, CMA

## 2023-08-11 NOTE — TELEPHONE ENCOUNTER
Pt advised needs RN BP visit in F/U to 06-28-23 OV- losartan increased. No home BP montioring.  RN visit scheduled 08-16-23.  EDSON Coronel RN

## 2023-08-16 ENCOUNTER — HOSPITAL ENCOUNTER (EMERGENCY)
Facility: CLINIC | Age: 58
Discharge: HOME | End: 2023-08-16
Attending: EMERGENCY MEDICINE | Admitting: EMERGENCY MEDICINE
Payer: COMMERCIAL

## 2023-08-16 ENCOUNTER — TELEPHONE (OUTPATIENT)
Dept: FAMILY MEDICINE | Facility: CLINIC | Age: 58
End: 2023-08-16

## 2023-08-16 ENCOUNTER — ALLIED HEALTH/NURSE VISIT (OUTPATIENT)
Dept: FAMILY MEDICINE | Facility: CLINIC | Age: 58
End: 2023-08-16
Payer: COMMERCIAL

## 2023-08-16 VITALS
BODY MASS INDEX: 23.54 KG/M2 | WEIGHT: 150 LBS | OXYGEN SATURATION: 94 % | RESPIRATION RATE: 16 BRPM | HEIGHT: 67 IN | HEART RATE: 73 BPM | TEMPERATURE: 98.1 F | SYSTOLIC BLOOD PRESSURE: 188 MMHG | DIASTOLIC BLOOD PRESSURE: 114 MMHG

## 2023-08-16 VITALS — SYSTOLIC BLOOD PRESSURE: 137 MMHG | DIASTOLIC BLOOD PRESSURE: 86 MMHG | HEART RATE: 68 BPM

## 2023-08-16 DIAGNOSIS — L03.011 PARONYCHIA OF FINGER OF RIGHT HAND: ICD-10-CM

## 2023-08-16 DIAGNOSIS — I10 ESSENTIAL HYPERTENSION: Primary | ICD-10-CM

## 2023-08-16 PROCEDURE — 99283 EMERGENCY DEPT VISIT LOW MDM: CPT | Performed by: EMERGENCY MEDICINE

## 2023-08-16 PROCEDURE — 99207 PR NO CHARGE NURSE ONLY: CPT

## 2023-08-16 PROCEDURE — 250N000013 HC RX MED GY IP 250 OP 250 PS 637: Performed by: EMERGENCY MEDICINE

## 2023-08-16 RX ORDER — ACETAMINOPHEN 500 MG
1000 TABLET ORAL EVERY 8 HOURS PRN
Qty: 30 TABLET | Refills: 0 | COMMUNITY
Start: 2023-08-16 | End: 2023-08-21

## 2023-08-16 RX ORDER — CEFUROXIME AXETIL 500 MG/1
500 TABLET ORAL 2 TIMES DAILY
Qty: 14 TABLET | Refills: 0 | Status: SHIPPED | OUTPATIENT
Start: 2023-08-16 | End: 2023-08-23

## 2023-08-16 RX ORDER — IBUPROFEN 200 MG
600 TABLET ORAL EVERY 8 HOURS PRN
Qty: 30 TABLET | Refills: 0 | COMMUNITY
Start: 2023-08-16 | End: 2023-08-21

## 2023-08-16 RX ORDER — CEFUROXIME AXETIL 250 MG/1
500 TABLET ORAL ONCE
Status: COMPLETED | OUTPATIENT
Start: 2023-08-16 | End: 2023-08-16

## 2023-08-16 RX ORDER — IBUPROFEN 400 MG/1
800 TABLET, FILM COATED ORAL ONCE
Status: COMPLETED | OUTPATIENT
Start: 2023-08-16 | End: 2023-08-16

## 2023-08-16 RX ADMIN — CEFUROXIME AXETIL 500 MG: 250 TABLET, FILM COATED ORAL at 22:43

## 2023-08-16 RX ADMIN — IBUPROFEN 800 MG: 400 TABLET ORAL at 22:43

## 2023-08-16 ASSESSMENT — ENCOUNTER SYMPTOMS
WEAKNESS: 0
COLOR CHANGE: 1
WOUND: 1
FATIGUE: 0
NUMBNESS: 0
CHEST TIGHTNESS: 0
CHILLS: 0
FEVER: 0
APPETITE CHANGE: 0
TREMORS: 0

## 2023-08-16 NOTE — TELEPHONE ENCOUNTER
BP Readings from Last 6 Encounters:   08/16/23 137/86   06/28/23 (!) 150/82   06/24/22 (!) 158/90   09/09/20 128/72   04/14/19 (!) 147/91     Blood pressure better on losartan 100 mg daily.  Plenty of refills at her pharmacy.  Azalia Eckert, CNP

## 2023-08-16 NOTE — TELEPHONE ENCOUNTER
Araceli Wilson is a 58 year old patient who comes in today for a Blood Pressure check because of ongoing blood pressure monitoring.  Vital Signs as repeated by /86, p-68  Patient is taking medication as prescribed  Patient is tolerating medications well.  Patient is not monitoring Blood Pressure at home.  Average readings if yes are NA  Current complaints: none  Disposition:  Pt was encourage to check her BP at home.  She will avoid high sodium foods and increase her exercising.

## 2023-08-16 NOTE — PROGRESS NOTES
Araceli Wilson is a 58 year old year old patient who comes in today for a Blood Pressure check because of ongoing blood pressure monitoring.  Vital Signs as repeated by /86, p-68  Patient is taking medication as prescribed  Patient is tolerating medications well.  Patient is not monitoring Blood Pressure at home.  Average readings if yes are NA  Current complaints: none  Disposition:  Pt was encourage to check her BP at home.  She will avoid high sodium foods and increase her exercising.

## 2023-08-17 NOTE — ED TRIAGE NOTES
Pt got nite by a bug on Sunday,. Bite is on right index finger. Site is red and painful.      Triage Assessment       Row Name 08/16/23 [id number]       Triage Assessment (Adult)    Airway WDL WDL       Respiratory WDL    Respiratory WDL WDL       Skin Circulation/Temperature WDL    Skin Circulation/Temperature WDL WDL       Cardiac WDL    Cardiac WDL WDL       Peripheral/Neurovascular WDL    Peripheral Neurovascular WDL WDL       Cognitive/Neuro/Behavioral WDL    Cognitive/Neuro/Behavioral WDL WDL

## 2023-08-17 NOTE — ED PROVIDER NOTES
History     Chief Complaint   Patient presents with    Insect Bite     HPI  Araceli Wilson is a 58 year old female with no significant contributing past medical history presenting for evaluation of a painful swollen right index finger.  She noticed symptoms first about 5 days ago and believes she may have been bitten by a spider.  She reports 2 small dark marks at the base of the fingernail where the swelling began.  She has since had swelling spread from the base of the fingernail around to the entire fingertip.  She reports increasing pressure and throbbing pain in the finger.  Denies seeing any drainage from the finger.  She did try sterilizing a needle to try to drain some fluid but did not find anything drained out when she did this.  Denies any trauma to the finger.  No fevers or chills.  Otherwise feeling well.  She did take 50 mg of Benadryl about an hour ago but has taken nothing else for pain.    Allergies:  No Known Allergies    Problem List:    Patient Active Problem List    Diagnosis Date Noted    Cervical high risk HPV (human papillomavirus) test positive 2018     Priority: Medium     ,  NIL Paps  2005 NIL Pap  2006 NIL Pap  2007 NIL Pap  2009 NIL Pap  16 NIL Pap, + HR HPV   3/13/17 NIL Pap, + HR HPV   18  NIL Pap, neg HPV   All above from Care Everywhere    20 NIL Pap, + HR HPV (neg 16/18). Plan cotest in 1 year.   10/27/21 Lost to follow-up for pap tracking  22 NIL pap, neg HPV. Plan: cotest in 1 year  23 Reminder letter  23 NIL Pap, Neg HR HPV Plan cotest in 3 years       Essential hypertension 2018     Priority: Medium        Past Medical History:    Past Medical History:   Diagnosis Date    Cervical high risk HPV (human papillomavirus) test positive , , 2020       Past Surgical History:    Past Surgical History:   Procedure Laterality Date    SURGICAL HISTORY OF -   [id number]     x2       Family History:    No family history on  "file.    Social History:  Marital Status:   [4]  Social History     Tobacco Use    Smoking status: Light Smoker     Types: Cigarettes    Smokeless tobacco: Never    Tobacco comments:     Social- 5-8 in a week.   Vaping Use    Vaping Use: Never used   Substance Use Topics    Alcohol use: Yes     Comment: social    Drug use: Never        Medications:    acetaminophen (TYLENOL) 500 MG tablet  cefuroxime (CEFTIN) 500 MG tablet  ibuprofen (ADVIL/MOTRIN) 200 MG tablet  Cholecalciferol (D 1000) 1000 units CAPS  COLLAGEN PO  fish oil-omega-3 fatty acids 1000 MG capsule  hydrocortisone, Perianal, (HYDROCORTISONE) 2.5 % cream  losartan (COZAAR) 100 MG tablet  Multiple Vitamin (MULTIVITAMIN ADULT PO)          Review of Systems   Constitutional:  Negative for appetite change, chills, fatigue and fever.   Respiratory:  Negative for chest tightness.    Cardiovascular:  Negative for chest pain.   Skin:  Positive for color change and wound (Right index finger).   Neurological:  Negative for tremors, weakness and numbness.   All other systems reviewed and are negative.      Physical Exam   BP: (!) 175/98  Pulse: 73  Temp: 98.1  F (36.7  C)  Resp: 18  Height: 170.2 cm (5' 7\")  Weight: 68 kg (150 lb)  SpO2: 98 %      Physical Exam  Vitals and nursing note reviewed.   Constitutional:       Appearance: Normal appearance. She is not ill-appearing or diaphoretic.   HENT:      Head: Atraumatic.   Cardiovascular:      Rate and Rhythm: Normal rate.      Pulses: Normal pulses.   Musculoskeletal:        Hands:    Skin:     General: Skin is warm and dry.      Capillary Refill: Capillary refill takes less than 2 seconds.   Neurological:      Mental Status: She is alert and oriented to person, place, and time.   Psychiatric:         Mood and Affect: Mood normal.         ED Course                 Procedures             No results found for this or any previous visit (from the past 24 hour(s)).    Medications   cefuroxime (CEFTIN) tablet " 500 mg (has no administration in time range)   ibuprofen (ADVIL/MOTRIN) tablet 800 mg (has no administration in time range)       Assessments & Plan (with Medical Decision Making)  58-year-old female presenting for evaluation of painful swollen right index finger.  No trauma but possibly had a bug bite.  Has evidence of a mild paronychia and possible developing felon.  I discussed my recommendation to attempt drainage but patient declined.  She would like to try antibiotics first and if symptoms continue to worsen, she states she will return for repeat evaluation and drainage.  Based on current evaluation, it appears that the infection is relatively early and mild and I expect will resolve with conservative treatment alone.  We will start patient empirically on cefuroxime and she was given strict return precautions if symptoms not improving or if they significantly worsen over the next 24 to 48 hours.     I have reviewed the nursing notes.    I have reviewed the findings, diagnosis, plan and need for follow up with the patient.      New Prescriptions    ACETAMINOPHEN (TYLENOL) 500 MG TABLET    Take 2 tablets (1,000 mg) by mouth every 8 hours as needed for fever or pain    CEFUROXIME (CEFTIN) 500 MG TABLET    Take 1 tablet (500 mg) by mouth 2 times daily for 7 days    IBUPROFEN (ADVIL/MOTRIN) 200 MG TABLET    Take 3 tablets (600 mg) by mouth every 8 hours as needed for mild pain       Final diagnoses:   Paronychia of finger of right hand       8/16/2023   [facility] EMERGENCY DEPT       Reddy, Sukhi Rose MD  08/16/23 [id number]

## 2023-09-26 ENCOUNTER — HOSPITAL ENCOUNTER (EMERGENCY)
Facility: CLINIC | Age: 58
Discharge: HOME | End: 2023-09-26
Attending: NURSE PRACTITIONER | Admitting: NURSE PRACTITIONER
Payer: COMMERCIAL

## 2023-09-26 ENCOUNTER — APPOINTMENT (OUTPATIENT)
Dept: GENERAL RADIOLOGY | Facility: CLINIC | Age: 58
End: 2023-09-26
Attending: NURSE PRACTITIONER
Payer: COMMERCIAL

## 2023-09-26 ENCOUNTER — APPOINTMENT (OUTPATIENT)
Dept: CT IMAGING | Facility: CLINIC | Age: 58
End: 2023-09-26
Attending: NURSE PRACTITIONER
Payer: COMMERCIAL

## 2023-09-26 VITALS
HEART RATE: 69 BPM | RESPIRATION RATE: 16 BRPM | HEIGHT: 67 IN | TEMPERATURE: 97.9 F | OXYGEN SATURATION: 95 % | SYSTOLIC BLOOD PRESSURE: 152 MMHG | BODY MASS INDEX: 24.33 KG/M2 | WEIGHT: 155 LBS | DIASTOLIC BLOOD PRESSURE: 78 MMHG

## 2023-09-26 DIAGNOSIS — T71.194A STRANGULATION OR SUFFOCATION BY MEANS, UNDETERMINED WHETHER ACCIDENTALLY OR PURPOSELY INFLICTED, INITIAL ENCOUNTER: ICD-10-CM

## 2023-09-26 DIAGNOSIS — S22.31XA CLOSED FRACTURE OF ONE RIB OF RIGHT SIDE, INITIAL ENCOUNTER: ICD-10-CM
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
## 2023-09-26 DIAGNOSIS — S42.202A CLOSED FRACTURE OF PROXIMAL END OF LEFT HUMERUS, UNSPECIFIED FRACTURE MORPHOLOGY, INITIAL ENCOUNTER: ICD-10-CM

## 2023-09-26 DIAGNOSIS — S01.311A LACERATION OF RIGHT EXTERNAL EAR, INITIAL ENCOUNTER: ICD-10-CM

## 2023-09-26 PROCEDURE — 23600 CLTX PROX HUMRL FX W/O MNPJ: CPT

## 2023-09-26 PROCEDURE — 96375 TX/PRO/DX INJ NEW DRUG ADDON: CPT

## 2023-09-26 PROCEDURE — 71101 X-RAY EXAM UNILAT RIBS/CHEST: CPT | Mod: RT

## 2023-09-26 PROCEDURE — 250N000009 HC RX 250: Performed by: NURSE PRACTITIONER

## 2023-09-26 PROCEDURE — 96374 THER/PROPH/DIAG INJ IV PUSH: CPT | Mod: 59

## 2023-09-26 PROCEDURE — 72125 CT NECK SPINE W/O DYE: CPT

## 2023-09-26 PROCEDURE — 250N000011 HC RX IP 250 OP 636: Mod: JZ | Performed by: NURSE PRACTITIONER

## 2023-09-26 PROCEDURE — 70498 CT ANGIOGRAPHY NECK: CPT

## 2023-09-26 PROCEDURE — 250N000011 HC RX IP 250 OP 636: Performed by: NURSE PRACTITIONER

## 2023-09-26 PROCEDURE — 73060 X-RAY EXAM OF HUMERUS: CPT | Mod: LT

## 2023-09-26 PROCEDURE — 99285 EMERGENCY DEPT VISIT HI MDM: CPT | Mod: 25 | Performed by: NURSE PRACTITIONER

## 2023-09-26 PROCEDURE — 70450 CT HEAD/BRAIN W/O DYE: CPT | Mod: XU

## 2023-09-26 PROCEDURE — 70496 CT ANGIOGRAPHY HEAD: CPT

## 2023-09-26 PROCEDURE — 99285 EMERGENCY DEPT VISIT HI MDM: CPT | Mod: 25

## 2023-09-26 PROCEDURE — 73030 X-RAY EXAM OF SHOULDER: CPT | Mod: LT

## 2023-09-26 PROCEDURE — 23600 CLTX PROX HUMRL FX W/O MNPJ: CPT | Mod: 54 | Performed by: NURSE PRACTITIONER

## 2023-09-26 RX ORDER — IOPAMIDOL 755 MG/ML
67 INJECTION, SOLUTION INTRAVASCULAR ONCE
Status: COMPLETED | OUTPATIENT
Start: 2023-09-26 | End: 2023-09-26

## 2023-09-26 RX ORDER — OXYCODONE AND ACETAMINOPHEN 5; 325 MG/1; MG/1
1 TABLET ORAL EVERY 6 HOURS PRN
Qty: 12 TABLET | Refills: 0 | Status: SHIPPED | OUTPATIENT
Start: 2023-09-26 | End: 2023-09-29

## 2023-09-26 RX ORDER — HYDROMORPHONE HYDROCHLORIDE 1 MG/ML
0.5 INJECTION, SOLUTION INTRAMUSCULAR; INTRAVENOUS; SUBCUTANEOUS ONCE
Status: COMPLETED | OUTPATIENT
Start: 2023-09-26 | End: 2023-09-26

## 2023-09-26 RX ORDER — ONDANSETRON 2 MG/ML
4 INJECTION INTRAMUSCULAR; INTRAVENOUS ONCE
Status: COMPLETED | OUTPATIENT
Start: 2023-09-26 | End: 2023-09-26

## 2023-09-26 RX ORDER — KETOROLAC TROMETHAMINE 15 MG/ML
15 INJECTION, SOLUTION INTRAMUSCULAR; INTRAVENOUS ONCE
Status: COMPLETED | OUTPATIENT
Start: 2023-09-26 | End: 2023-09-26

## 2023-09-26 RX ORDER — MORPHINE SULFATE 4 MG/ML
4 INJECTION, SOLUTION INTRAMUSCULAR; INTRAVENOUS ONCE
Status: COMPLETED | OUTPATIENT
Start: 2023-09-26 | End: 2023-09-26

## 2023-09-26 RX ADMIN — MORPHINE SULFATE 4 MG: 4 INJECTION, SOLUTION INTRAMUSCULAR; INTRAVENOUS at 15:30

## 2023-09-26 RX ADMIN — ONDANSETRON 4 MG: 2 INJECTION INTRAMUSCULAR; INTRAVENOUS at 15:35

## 2023-09-26 RX ADMIN — HYDROMORPHONE HYDROCHLORIDE 0.5 MG: 1 INJECTION, SOLUTION INTRAMUSCULAR; INTRAVENOUS; SUBCUTANEOUS at 17:10

## 2023-09-26 RX ADMIN — KETOROLAC TROMETHAMINE 15 MG: 15 INJECTION, SOLUTION INTRAMUSCULAR; INTRAVENOUS at 18:37

## 2023-09-26 RX ADMIN — IOPAMIDOL 67 ML: 755 INJECTION, SOLUTION INTRAVENOUS at 16:04

## 2023-09-26 RX ADMIN — SODIUM CHLORIDE 100 ML: 9 INJECTION, SOLUTION INTRAVENOUS at 16:04

## 2023-09-26 ASSESSMENT — ACTIVITIES OF DAILY LIVING (ADL)
ADLS_ACUITY_SCORE: 35

## 2023-09-26 NOTE — DISCHARGE INSTRUCTIONS
"EAR LACERATION:  No antibiotic ointments on this as it would cause glue to break down.   Return to the emergency department you experience increased swelling, pain, redness or drainage from the wound or if they develop a temp > 101.5.    There may be some residual scarring from the wound.       RIB FRACTURE:  Take pain medication as ordered. Don't drive, operate machinery, climb ladders, sign contracts, go gambling, participate in excessive shopping or spending or participate in any other activity that could be considered dangerous while taking or be jeopardized by altered judgement.  Supplement with ibuprofen or Tylenol if not contraindicated, and ice. Don't take more than 4,000mg of Tylenol/Aceteminophen in any 24 hour period.  Splint with a pillow when coughing  Practice good \"pulmonary toilet\" by turning, coughing, and deep breathing  If symptoms worsen, return to ED (particularly regarding trouble breathing). If you don't note improvement over the next 1-2 weeks, then follow-up with primary care provider.    LEFT HUMERUS FRACTURE:  Follow this week with ortho. If they do not call you by mid-morning of the next business day, please call the hospital and ask for that department's appointment desk. Tell them you were seen in the ER and asked to be seen for follow-up.  Keep sling on at all times until seen by ortho  Take tylenol or ibuprofen for pain. Supplement with Percocet for more severe pain. Don't drive, operate machinery, climb ladders, sign contracts, go gambling, participate in excessive shopping or spending, or participate in any other activity that could be considered dangerous while taking or be jeopardized by altered judgement.  Ice 15-20 minutes at a time about 3-4 times daily  Return to ED sooner if symptoms worsen, pain, discoloration, sensation, or circulation concerns    STRANGLED:  Women who had sustained a non-fatal strangulation event have a seven-fold risk of becoming a victim of a homicide in " the future. It is one of the single greatest risk factors in intimate partner relationship violence as a predictor of homicide.  Use ice to injured areas over the next couple days, 20 minutes at a time, 4x daily  Take Tylenol and Ibuprofen as needed  For the next 24 hours: Do not take alcohol, sedatives, or medicines that make you sleepy, avoid prolonged driving or operating machinery, or strenuous activities.  Return to ED if you develop vomiting, hoarseness, facial rash, neck swelling, vomiting, or prolonged headache.
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
## 2023-09-29 ENCOUNTER — TELEPHONE (OUTPATIENT)
Dept: FAMILY MEDICINE | Facility: CLINIC | Age: 58
End: 2023-09-29
Payer: COMMERCIAL

## 2023-09-29 ENCOUNTER — VIRTUAL VISIT (OUTPATIENT)
Dept: FAMILY MEDICINE | Facility: CLINIC | Age: 58
End: 2023-09-29
Payer: COMMERCIAL

## 2023-09-29 DIAGNOSIS — S42.292D OTHER CLOSED DISPLACED FRACTURE OF PROXIMAL END OF LEFT HUMERUS WITH ROUTINE HEALING, SUBSEQUENT ENCOUNTER: Primary | ICD-10-CM

## 2023-09-29 DIAGNOSIS — S22.39XA CLOSED FRACTURE OF ONE RIB, UNSPECIFIED LATERALITY, INITIAL ENCOUNTER: ICD-10-CM

## 2023-09-29 PROCEDURE — 99214 OFFICE O/P EST MOD 30 MIN: CPT | Mod: 95 | Performed by: PHYSICIAN ASSISTANT

## 2023-09-29 RX ORDER — OXYCODONE HYDROCHLORIDE 5 MG/1
5 TABLET ORAL EVERY 6 HOURS PRN
Qty: 20 TABLET | Refills: 0 | Status: SHIPPED | OUTPATIENT
Start: 2023-09-29 | End: 2023-10-09
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
## 2023-09-29 NOTE — PATIENT INSTRUCTIONS
Carmen Charles,    Thank you for allowing [facility] to manage your care.    If you develop worsening/changing symptoms at any time, please call 911 or go to the emergency department for evaluation as we discussed.    I sent your prescriptions to your pharmacy.    For your pain, please use Tylenol 500mg every 4 hours. You may use 400mg of ibuprofen between doses of Tylenol.     Max acetaminophen (Tylenol) 3,000mg/24 hours  Max ibuprofen 2,000mg/24 hours      Narcotics Discharge Instructions: oxycodone    You have been prescribed a narcotic pain medication that has risk for addiction with prolonged use, so please use sparingly.  Additionally, narcotics are medications that are sedating (will make you sleepy), so do not drive or operate machinery while taking this medication.  Avoid alcohol or other sedating medicines such as benzodiazepines while taking narcotics due to risk for increased sedation and difficulty breathing.      Narcotics will cause constipation.  If you need to take this medication please consider taking an over-the-counter stool softener and laxative, such as Senna Plus, to prevent constipation from developing.  Nausea is a side effect of narcotic use.  Possible additional side effects include vomiting, itching, and dizziness/lightheadedness.    If you have any questions or concerns, please feel free to call us at [phone]    Sincerely,    Nick Marquez PA-C    Did you know?      You can schedule a video visit for follow-up appointments as well as future appointments for certain conditions.  Please see the below link.     [email or web address]    If you have not already done so,  I encourage you to sign up for [employer] ([email or web address]).  This will allow you to review your results, securely communicate with a provider, and schedule virtual visits as well.
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
## 2023-10-03 ENCOUNTER — ANCILLARY PROCEDURE (OUTPATIENT)
Dept: GENERAL RADIOLOGY | Facility: CLINIC | Age: 58
End: 2023-10-03
Attending: PEDIATRICS
Payer: COMMERCIAL

## 2023-10-03 ENCOUNTER — TELEPHONE (OUTPATIENT)
Dept: ORTHOPEDICS | Facility: CLINIC | Age: 58
End: 2023-10-03

## 2023-10-03 ENCOUNTER — OFFICE VISIT (OUTPATIENT)
Dept: ORTHOPEDICS | Facility: CLINIC | Age: 58
End: 2023-10-03
Attending: NURSE PRACTITIONER
Payer: COMMERCIAL

## 2023-10-03 VITALS — HEIGHT: 67 IN | BODY MASS INDEX: 24.33 KG/M2 | WEIGHT: 155 LBS

## 2023-10-03 DIAGNOSIS — S42.202A CLOSED FRACTURE OF PROXIMAL END OF LEFT HUMERUS, UNSPECIFIED FRACTURE MORPHOLOGY, INITIAL ENCOUNTER: ICD-10-CM

## 2023-10-03 DIAGNOSIS — S22.31XA CLOSED FRACTURE OF ONE RIB OF RIGHT SIDE, INITIAL ENCOUNTER: ICD-10-CM

## 2023-10-03 DIAGNOSIS — S42.292A OTHER CLOSED DISPLACED FRACTURE OF PROXIMAL END OF LEFT HUMERUS, INITIAL ENCOUNTER: Primary | ICD-10-CM

## 2023-10-03 PROCEDURE — 71101 X-RAY EXAM UNILAT RIBS/CHEST: CPT | Mod: TC | Performed by: RADIOLOGY

## 2023-10-03 PROCEDURE — 73030 X-RAY EXAM OF SHOULDER: CPT | Mod: TC | Performed by: RADIOLOGY

## 2023-10-03 PROCEDURE — 99204 OFFICE O/P NEW MOD 45 MIN: CPT | Performed by: PEDIATRICS

## 2023-10-03 NOTE — PATIENT INSTRUCTIONS
Reviewed the updated x-rays of the left shoulder today, demonstrating comminuted, displaced humeral head and proximal humerus fracture.  With this, suggest further evaluation through orthopedic surgery.  Referral has been placed.  We discussed options closer to home, including through [facility] orthopedics, as well as other options through [place].  In the meantime, continue with sling.  We discussed potential adding swath.  Could also consider use of shoulder immobilizer as alternative.    Regarding the rib injury, this is primarily supportive cares.  May do icing, over-the-counter medication as needed for soreness.    Can follow-up here in 3-4 weeks for rib if desired. Otherwise, focus on left shoulder fracture for now.    If you have any further questions for your physician or physician s care team you can contact them thru [email or web address] or by calling [phone].
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
## 2023-10-09 ENCOUNTER — OFFICE VISIT (OUTPATIENT)
Dept: FAMILY MEDICINE | Facility: CLINIC | Age: 58
End: 2023-10-09
Payer: COMMERCIAL

## 2023-10-09 VITALS
TEMPERATURE: 98.2 F | HEART RATE: 83 BPM | SYSTOLIC BLOOD PRESSURE: 152 MMHG | BODY MASS INDEX: 24.31 KG/M2 | OXYGEN SATURATION: 97 % | DIASTOLIC BLOOD PRESSURE: 90 MMHG | WEIGHT: 154.9 LBS | HEIGHT: 67 IN | RESPIRATION RATE: 12 BRPM

## 2023-10-09 DIAGNOSIS — S42.292D OTHER CLOSED DISPLACED FRACTURE OF PROXIMAL END OF LEFT HUMERUS WITH ROUTINE HEALING, SUBSEQUENT ENCOUNTER: ICD-10-CM

## 2023-10-09 DIAGNOSIS — S22.39XA CLOSED FRACTURE OF ONE RIB, UNSPECIFIED LATERALITY, INITIAL ENCOUNTER: ICD-10-CM

## 2023-10-09 PROCEDURE — 99214 OFFICE O/P EST MOD 30 MIN: CPT | Performed by: NURSE PRACTITIONER

## 2023-10-09 RX ORDER — OXYCODONE HYDROCHLORIDE 5 MG/1
5 TABLET ORAL EVERY 6 HOURS PRN
Qty: 20 TABLET | Refills: 0 | Status: SHIPPED | OUTPATIENT
Start: 2023-10-09 | End: 2023-11-08

## 2023-10-09 ASSESSMENT — PAIN SCALES - GENERAL: PAINLEVEL: SEVERE PAIN (7)

## 2023-10-09 NOTE — PATIENT INSTRUCTIONS
Tylenol 1000 mg every 8 hours as needed    Ibuprofen 600 mg every 6 hours as needed (hold off until you know surgery date as you can't have this for one week prior to surgery).    Oxycodone one tablet every 6 hours if needed for severe pain.
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
## 2023-10-17 ENCOUNTER — ANCILLARY PROCEDURE (OUTPATIENT)
Dept: CT IMAGING | Facility: CLINIC | Age: 58
End: 2023-10-17
Attending: ORTHOPAEDIC SURGERY
Payer: COMMERCIAL

## 2023-10-17 DIAGNOSIS — M25.512 LEFT SHOULDER PAIN: ICD-10-CM

## 2023-10-17 PROCEDURE — 73200 CT UPPER EXTREMITY W/O DYE: CPT | Mod: LT | Performed by: RADIOLOGY

## 2023-11-07 DIAGNOSIS — S42.292D OTHER CLOSED DISPLACED FRACTURE OF PROXIMAL END OF LEFT HUMERUS WITH ROUTINE HEALING, SUBSEQUENT ENCOUNTER: ICD-10-CM

## 2023-11-07 DIAGNOSIS — S22.39XA CLOSED FRACTURE OF ONE RIB, UNSPECIFIED LATERALITY, INITIAL ENCOUNTER: ICD-10-CM

## 2023-11-07 NOTE — TELEPHONE ENCOUNTER
Medication Question or Refill    Contacts         Type Contact Phone/Fax    11/07/2023 11:36 AM CST Phone (Incoming) Araceli Wilson (Self) [phone] (H)            What medication are you calling about (include dose and sig)?: Oxycodone    Preferred Pharmacy:     [facility] [address]  [address] [address]  Phone: [phone] Fax: [phone] Alternate Fax: [phone], [phone]      Controlled Substance Agreement on file:   CSA -- Patient Level:    CSA: None found at the patient level.       Who prescribed the medication?: Azalia Eckert    Do you need a refill? Yes    When did you use the medication last? Today    Patient offered an appointment? No    Do you have any questions or concerns?  Yes: Patient broke shoulder and is now in PT, would like enough Oxycodone to last for the next week.     Okay to leave a detailed message?: Yes at Home number on file [phone] (home)

## 2023-11-09 RX ORDER — OXYCODONE HYDROCHLORIDE 5 MG/1
5 TABLET ORAL EVERY 6 HOURS PRN
Qty: 20 TABLET | Refills: 0 | Status: SHIPPED | OUTPATIENT
Start: 2023-11-09 | End: 2024-01-02

## 2023-11-09 NOTE — TELEPHONE ENCOUNTER
Routing to ordering provider for consideration, not on refill protocol.           Armida Santillan     RN MSN

## 2023-12-18 ENCOUNTER — TELEPHONE (OUTPATIENT)
Dept: FAMILY MEDICINE | Facility: CLINIC | Age: 58
End: 2023-12-18
Payer: COMMERCIAL

## 2023-12-18 NOTE — TELEPHONE ENCOUNTER
Pt has an appt with Azalia Eckert on 1/2.   Can she have a short refill of oxycodone until then?  She is currently still doing physical therapy too.

## 2023-12-18 NOTE — TELEPHONE ENCOUNTER
Patient Quality Outreach    Patient is due for the following:   Colon Cancer Screening    Next Steps:   Patient has upcoming appointment, these items will be addressed at that time.    Type of outreach:    Chart review performed, no outreach needed.      Questions for provider review:    None           Shankar Schmidt, CMA

## 2024-01-02 ENCOUNTER — OFFICE VISIT (OUTPATIENT)
Dept: FAMILY MEDICINE | Facility: CLINIC | Age: 59
End: 2024-01-02
Payer: COMMERCIAL

## 2024-01-02 VITALS
BODY MASS INDEX: 25.01 KG/M2 | DIASTOLIC BLOOD PRESSURE: 80 MMHG | TEMPERATURE: 97.2 F | WEIGHT: 155.6 LBS | RESPIRATION RATE: 20 BRPM | OXYGEN SATURATION: 98 % | SYSTOLIC BLOOD PRESSURE: 140 MMHG | HEIGHT: 66 IN | HEART RATE: 92 BPM

## 2024-01-02 DIAGNOSIS — Z13.6 CARDIOVASCULAR SCREENING; LDL GOAL LESS THAN 100: ICD-10-CM

## 2024-01-02 DIAGNOSIS — E78.5 HYPERLIPIDEMIA LDL GOAL <100: ICD-10-CM

## 2024-01-02 DIAGNOSIS — E04.1 THYROID NODULE: Primary | ICD-10-CM

## 2024-01-02 DIAGNOSIS — Z12.11 COLON CANCER SCREENING: ICD-10-CM

## 2024-01-02 DIAGNOSIS — I10 ESSENTIAL HYPERTENSION: ICD-10-CM

## 2024-01-02 LAB
ALT SERPL W P-5'-P-CCNC: 42 U/L (ref 0–50)
CHOLEST SERPL-MCNC: 195 MG/DL
FASTING STATUS PATIENT QL REPORTED: NO
HDLC SERPL-MCNC: 64 MG/DL
LDLC SERPL CALC-MCNC: 102 MG/DL
NONHDLC SERPL-MCNC: 131 MG/DL
TRIGL SERPL-MCNC: 143 MG/DL

## 2024-01-02 PROCEDURE — 84460 ALANINE AMINO (ALT) (SGPT): CPT | Performed by: NURSE PRACTITIONER

## 2024-01-02 PROCEDURE — 36415 COLL VENOUS BLD VENIPUNCTURE: CPT | Performed by: NURSE PRACTITIONER

## 2024-01-02 PROCEDURE — 80061 LIPID PANEL: CPT | Performed by: NURSE PRACTITIONER

## 2024-01-02 PROCEDURE — 99214 OFFICE O/P EST MOD 30 MIN: CPT | Performed by: NURSE PRACTITIONER

## 2024-01-02 RX ORDER — ROSUVASTATIN CALCIUM 20 MG/1
20 TABLET, COATED ORAL DAILY
Qty: 90 TABLET | Refills: 3 | Status: SHIPPED | OUTPATIENT
Start: 2024-01-02 | End: 2024-10-03

## 2024-01-02 RX ORDER — HYDROCHLOROTHIAZIDE 12.5 MG/1
12.5 TABLET ORAL DAILY
Qty: 30 TABLET | Refills: 0 | Status: SHIPPED | OUTPATIENT
Start: 2024-01-02 | End: 2024-01-16

## 2024-01-02 ASSESSMENT — PAIN SCALES - GENERAL: PAINLEVEL: MILD PAIN (3)

## 2024-01-02 NOTE — PROGRESS NOTES
Assessment & Plan     Thyroid nodule  Incidental finding of calcification in her left thyroid lobe.  Recommend further imaging with a thyroid ultrasound.  - US Thyroid; Future    Essential hypertension  Poorly controlled.  Continue losartan 100 mg daily.  Add:  - hydrochlorothiazide (HYDRODIURIL) 12.5 MG tablet; Take 1 tablet (12.5 mg) by mouth daily  Recheck with an RN in 2 weeks.    CARDIOVASCULAR SCREENING; LDL GOAL LESS THAN 100  Discussed head CT results and implications of the small vessel ischemic disease.  Discussed importance of good blood pressure and lipid control.  Strongly encourage smoking cessation.  Will recheck lipids today:  - Lipid panel reflex to direct LDL Fasting; Future  - ALT; Future  - Lipid panel reflex to direct LDL Fasting  - ALT      Hyperlipidemia:  New diagnosis.  Lipids continue to remain mildly elevated; ASCVD risk score high.  Given these findings and small vessel ischemic disease on brain imaging, along with her other risk factors, recommend starting statin therapy.  Prescription sent in for rosuvastatin 20 mg daily.  Repeat labs in 6 weeks.      Colon cancer screening  - Colonoscopy Screening  Referral; Future      The risks, benefits and treatment options of prescribed medications or other treatments have been discussed with the patient. The patient verbalized their understanding and should call or follow up if no improvement or if they develop further problems.  SONIDO Saunders CNP  [facility]              Aidan Charles is a 58 year old, presenting for the following health issues:  Follow Up (Shoulder, left side) and Hypertension (Check/)        1/2/2024    10:04 AM   Additional Questions   Roomed by Cris Del Castillo   Accompanied by self         1/2/2024    10:04 AM   Patient Reported Additional Medications   Patient reports taking the following new medications none       History of Present Illness       Reason for visit:  Er follow  "up    She eats 0-1 servings of fruits and vegetables daily.She consumes 2 sweetened beverage(s) daily.She exercises with enough effort to increase her heart rate 10 to 19 minutes per day.  She exercises with enough effort to increase her heart rate 4 days per week.   She is taking medications regularly.         Hypertension Follow-up    Do you check your blood pressure regularly outside of the clinic? No   Are you following a low salt diet? No- doesn't eat a lot of salt  Are your blood pressures ever more than 140 on the top number (systolic) OR more   than 90 on the bottom number (diastolic), for example 140/90? Yes      BP Readings from Last 6 Encounters:   01/02/24 (!) 140/80   10/09/23 (!) 152/90   09/26/23 (!) 152/78   08/16/23 (!) 188/114   08/16/23 137/86   06/28/23 (!) 150/82         Patient would also like to follow-up on some results from other provider visits:  She had a shoulder CT done in October with her orthopedic doctor.  There was a comment in the report about \"punctate calcification in the inferior portion of the left thyroid lobe\".  She is also concerned about results on head CT done in the ER in September that shows \"age-related advanced moderate to severe presumed proximal vessel ischemic changes\".        Review of Systems   Constitutional, HEENT, cardiovascular, pulmonary, gi and gu systems are negative, except as otherwise noted.      Objective    BP (!) 140/86 (BP Location: Right arm, Patient Position: Sitting, Cuff Size: Adult Regular)   Pulse 92   Temp 97.2  F (36.2  C) (Tympanic)   Resp 20   Ht 1.683 m (5' 6.25\")   Wt 70.6 kg (155 lb 9.6 oz)   LMP  (LMP Unknown)   SpO2 98%   BMI 24.93 kg/m    Body mass index is 24.93 kg/m .  Physical Exam   GENERAL: healthy, alert and no distress  NECK: no adenopathy, no asymmetry, masses, or scars and thyroid normal to palpation  RESP: lungs clear to auscultation - no rales, rhonchi or wheezes  CV: regular rate and rhythm, normal S1 S2, no S3 or " S4, no murmur, click or rub, no peripheral edema and peripheral pulses strong  MS: no gross musculoskeletal defects noted, no edema    Results for orders placed or performed in visit on 01/02/24 (from the past 24 hour(s))   Lipid panel reflex to direct LDL Fasting   Result Value Ref Range    Cholesterol 195 <200 mg/dL    Triglycerides 143 <150 mg/dL    Direct Measure HDL 64 >=50 mg/dL    LDL Cholesterol Calculated 102 (H) <=100 mg/dL    Non HDL Cholesterol 131 (H) <130 mg/dL    Patient Fasting > 8hrs? No     Narrative    Cholesterol  Desirable:  <200 mg/dL    Triglycerides  Normal:  Less than 150 mg/dL  Borderline High:  150-199 mg/dL  High:  200-499 mg/dL  Very High:  Greater than or equal to 500 mg/dL    Direct Measure HDL  Female:  Greater than or equal to 50 mg/dL   Male:  Greater than or equal to 40 mg/dL    LDL Cholesterol  Desirable:  <100mg/dL  Above Desirable:  100-129 mg/dL   Borderline High:  130-159 mg/dL   High:  160-189 mg/dL   Very High:  >= 190 mg/dL    Non HDL Cholesterol  Desirable:  130 mg/dL  Above Desirable:  130-159 mg/dL  Borderline High:  160-189 mg/dL  High:  190-219 mg/dL  Very High:  Greater than or equal to 220 mg/dL   ALT   Result Value Ref Range    ALT 42 0 - 50 U/L       The 10-year ASCVD risk score (Corine AHMADI, et al., 2019) is: 9.7%    Values used to calculate the score:      Age: 58 years      Sex: Female      Is Non- : No      Diabetic: No      Tobacco smoker: Yes      Systolic Blood Pressure: 140 mmHg      Is BP treated: Yes      HDL Cholesterol: 64 mg/dL      Total Cholesterol: 195 mg/dL

## 2024-01-09 ENCOUNTER — HOSPITAL ENCOUNTER (OUTPATIENT)
Dept: ULTRASOUND IMAGING | Facility: CLINIC | Age: 59
Discharge: HOME | End: 2024-01-09
Attending: NURSE PRACTITIONER | Admitting: NURSE PRACTITIONER
Payer: COMMERCIAL

## 2024-01-09 DIAGNOSIS — E04.1 THYROID NODULE: ICD-10-CM

## 2024-01-09 PROCEDURE — 76536 US EXAM OF HEAD AND NECK: CPT

## 2024-01-10 PROBLEM — E04.1 THYROID NODULE: Status: ACTIVE | Noted: 2024-01-10

## 2024-01-16 ENCOUNTER — ALLIED HEALTH/NURSE VISIT (OUTPATIENT)
Dept: FAMILY MEDICINE | Facility: CLINIC | Age: 59
End: 2024-01-16
Payer: COMMERCIAL

## 2024-01-16 ENCOUNTER — TELEPHONE (OUTPATIENT)
Dept: FAMILY MEDICINE | Facility: CLINIC | Age: 59
End: 2024-01-16

## 2024-01-16 VITALS — OXYGEN SATURATION: 100 % | DIASTOLIC BLOOD PRESSURE: 82 MMHG | HEART RATE: 77 BPM | SYSTOLIC BLOOD PRESSURE: 120 MMHG

## 2024-01-16 DIAGNOSIS — I10 ESSENTIAL HYPERTENSION: Primary | ICD-10-CM

## 2024-01-16 DIAGNOSIS — I10 ESSENTIAL HYPERTENSION: ICD-10-CM

## 2024-01-16 PROCEDURE — 99207 PR NO CHARGE NURSE ONLY: CPT

## 2024-01-16 RX ORDER — HYDROCHLOROTHIAZIDE 12.5 MG/1
12.5 TABLET ORAL DAILY
Qty: 90 TABLET | Refills: 3 | Status: SHIPPED | OUTPATIENT
Start: 2024-01-16 | End: 2024-10-03 | Stop reason: DRUGHIGH

## 2024-01-16 NOTE — PROGRESS NOTES
Araceli Wilson is a 58 year old year old patient who comes in today for a Blood Pressure check because of new medication.    Vital Signs as repeated by /80 p 77, 120/82 p 77    Patient is taking medication as prescribed    Patient is tolerating medications well.    Patient is not monitoring Blood Pressure at home.   Current complaints: none    Disposition:  patient to continue with the same medication and await provider response.       Arturo Wagner RN

## 2024-01-16 NOTE — TELEPHONE ENCOUNTER
BP Readings from Last 6 Encounters:   01/16/24 120/82   01/02/24 (!) 140/80   10/09/23 (!) 152/90   09/26/23 (!) 152/78   08/16/23 (!) 188/114   08/16/23 137/86         Blood pressure is much improved.  Continue current medications.  Hydrochlorothiazide refills were sent in.  Azalia Eckert CNP

## 2024-04-22 ENCOUNTER — TELEPHONE (OUTPATIENT)
Dept: FAMILY MEDICINE | Facility: CLINIC | Age: 59
End: 2024-04-22
Payer: COMMERCIAL

## 2024-04-22 NOTE — LETTER
April 22, 2024      Araceli TRENT [facility]  [id number]  [facility] [address]        Dear Araceli,       Your team at [facility] cares about your health. We have reviewed your chart and based on our findings; we are making the following recommendations to better manage your health.     You are in particular need of attention regarding the following:     Call or MyChart message your clinic to schedule a colonoscopy, schedule/ a FIT Test, or order a Cologuard test. If you are unsure what type of test you need, please call your clinic and speak to clinic staff.   Colon cancer is now the second leading cause of cancer-related deaths in the [facility] for both men and women and there are over 130,000 new cases and 50,000 deaths per year from colon cancer. Colonoscopies can prevent 90-95% of these deaths. Problem lesions can be removed before they ever become cancer. This test is not only looking for cancer, but also getting rid of precancerous lesions.   If you are under/uninsured, we recommend you contact the [employer] Program.[employer] is a free colorectal cancer screening program that provides colonoscopies for eligible under/uninsured [place] men and women. If you are interested in receiving a free colonoscopy, please call [employer] at t [phone] (mention code [employer]) to see if you're eligible. Please have them send us the results.     If you have already completed these items, please contact the clinic via phone or   [facility] [employer] so your care team can review and update your records. Thank you for   choosing [facility] for your healthcare needs. For any questions,   concerns, or to schedule an appointment please contact our clinic.    Sincerely,        SONIDO Saunders CNP

## 2024-04-22 NOTE — TELEPHONE ENCOUNTER
Patient Quality Outreach    Patient is due for the following:   Colon Cancer Screening    Next Steps:   DUE FOR COLON CANCER SCREEN     Type of outreach:    Sent letter.      Questions for provider review:    None           Shankar Schmidt, CMA

## 2024-05-29 ENCOUNTER — PATIENT OUTREACH (OUTPATIENT)
Dept: CARE COORDINATION | Facility: CLINIC | Age: 59
End: 2024-05-29
Payer: COMMERCIAL

## 2024-06-12 ENCOUNTER — PATIENT OUTREACH (OUTPATIENT)
Dept: CARE COORDINATION | Facility: CLINIC | Age: 59
End: 2024-06-12
Payer: COMMERCIAL

## 2024-07-05 DIAGNOSIS — I10 ESSENTIAL HYPERTENSION: ICD-10-CM

## 2024-07-05 RX ORDER — LOSARTAN POTASSIUM 100 MG/1
100 TABLET ORAL DAILY
Qty: 90 TABLET | Refills: 0 | Status: SHIPPED | OUTPATIENT
Start: 2024-07-05 | End: 2024-10-03

## 2024-07-05 NOTE — TELEPHONE ENCOUNTER
Please contact patient to make lab appointment to complete BMP for further refill on BP medication.  Kenzie Townsend NP on 7/5/2024 at 1:58 PM

## 2024-07-05 NOTE — TELEPHONE ENCOUNTER
Called and scheduled patient for lab appointment for BMP on Friday 07/12/2024.    Dominique Sheriff on 7/5/2024 at 2:22 PM

## 2024-07-05 NOTE — TELEPHONE ENCOUNTER
Pending Prescriptions:                       Disp   Refills    losartan (COZAAR) 100 MG tablet [Pharmacy *90 tab*0        Sig: TAKE 1 TABLET (100 MG) BY MOUTH DAILY    Routing refill request to provider for review/approval because:  Labs not current:  GFR, potassium    Potassium   Date Value Ref Range Status   06/28/2023 3.9 3.4 - 5.3 mmol/L Final   06/24/2022 3.7 3.4 - 5.3 mmol/L Final   09/09/2020 4.2 3.4 - 5.3 mmol/L Final     GFR Estimate   Date Value Ref Range Status   06/28/2023 >90 >60 mL/min/1.73m2 Final   09/09/2020 >90 >60 mL/min/[1.73_m2] Final     Comment:     Non  GFR Calc  Starting 12/18/2018, serum creatinine based estimated GFR (eGFR) will be   calculated using the Chronic Kidney Disease Epidemiology Collaboration   (CKD-EPI) equation.       Aniyah Felder RN  [facility]

## 2024-07-11 ENCOUNTER — PATIENT OUTREACH (OUTPATIENT)
Dept: CARE COORDINATION | Facility: CLINIC | Age: 59
End: 2024-07-11
Payer: COMMERCIAL

## 2024-07-12 ENCOUNTER — LAB (OUTPATIENT)
Dept: LAB | Facility: CLINIC | Age: 59
End: 2024-07-12
Payer: COMMERCIAL

## 2024-07-12 DIAGNOSIS — I10 ESSENTIAL HYPERTENSION: ICD-10-CM

## 2024-07-12 LAB
ANION GAP SERPL CALCULATED.3IONS-SCNC: 8 MMOL/L (ref 7–15)
BUN SERPL-MCNC: 18.2 MG/DL (ref 8–23)
CALCIUM SERPL-MCNC: 9.6 MG/DL (ref 8.8–10.2)
CHLORIDE SERPL-SCNC: 107 MMOL/L (ref 98–107)
CREAT SERPL-MCNC: 0.79 MG/DL (ref 0.51–0.95)
DEPRECATED HCO3 PLAS-SCNC: 28 MMOL/L (ref 22–29)
EGFRCR SERPLBLD CKD-EPI 2021: 85 ML/MIN/1.73M2
GLUCOSE SERPL-MCNC: 94 MG/DL (ref 70–99)
POTASSIUM SERPL-SCNC: 4 MMOL/L (ref 3.4–5.3)
SODIUM SERPL-SCNC: 143 MMOL/L (ref 135–145)

## 2024-07-12 PROCEDURE — 36415 COLL VENOUS BLD VENIPUNCTURE: CPT

## 2024-07-12 PROCEDURE — 80048 BASIC METABOLIC PNL TOTAL CA: CPT

## 2024-07-29 ENCOUNTER — TELEPHONE (OUTPATIENT)
Dept: FAMILY MEDICINE | Facility: CLINIC | Age: 59
End: 2024-07-29
Payer: COMMERCIAL

## 2024-07-29 NOTE — LETTER
July 29, 2024      Araceli TRENT [facility]  [id number]  [place] [place] [address]        Dear Araceli,       Your team at [facility] cares about your health. We have reviewed your chart and based on our findings; we are making the following recommendations to better manage your health.     You are in particular need of attention regarding the following:     Call or MyChart message your clinic to schedule a colonoscopy, schedule/ a FIT Test, or order a Cologuard test. If you are unsure what type of test you need, please call your clinic and speak to clinic staff.   Colon cancer is now the second leading cause of cancer-related deaths in the [facility] for both men and women and there are over 130,000 new cases and 50,000 deaths per year from colon cancer. Colonoscopies can prevent 90-95% of these deaths. Problem lesions can be removed before they ever become cancer. This test is not only looking for cancer, but also getting rid of precancerous lesions.   If you are under/uninsured, we recommend you contact the [employer] Program.[employer] is a free colorectal cancer screening program that provides colonoscopies for eligible under/uninsured [place] men and women. If you are interested in receiving a free colonoscopy, please call [employer] at t [phone] (mention code [employer]) to see if you're eligible. Please have them send us the results.   PREVENTATIVE VISIT: Physical    If you have already completed these items, please contact the clinic via phone or   [email or web address] so your care team can review and update your records. Thank you for   choosing [facility] for your healthcare needs. For any questions,   concerns, or to schedule an appointment please contact our clinic.      Sincerely,        SONIDO Saunders CNP

## 2024-07-29 NOTE — TELEPHONE ENCOUNTER
Patient Quality Outreach    Patient is due for the following:   Colon Cancer Screening  Physical Preventive Adult Physical    Next Steps:   Schedule a Adult Preventative    Type of outreach:    Sent letter.      Questions for provider review:    None           Shankar Schmidt, CMA

## 2024-10-02 ENCOUNTER — LAB (OUTPATIENT)
Dept: LAB | Facility: CLINIC | Age: 59
End: 2024-10-02
Payer: COMMERCIAL

## 2024-10-02 DIAGNOSIS — E78.5 HYPERLIPIDEMIA LDL GOAL <100: ICD-10-CM

## 2024-10-02 DIAGNOSIS — E04.1 THYROID NODULE: ICD-10-CM

## 2024-10-02 LAB
ALT SERPL W P-5'-P-CCNC: 30 U/L (ref 0–50)
CHOLEST SERPL-MCNC: 141 MG/DL
FASTING STATUS PATIENT QL REPORTED: YES
HDLC SERPL-MCNC: 67 MG/DL
LDLC SERPL CALC-MCNC: 52 MG/DL
NONHDLC SERPL-MCNC: 74 MG/DL
TRIGL SERPL-MCNC: 109 MG/DL
TSH SERPL DL<=0.005 MIU/L-ACNC: 1.63 UIU/ML (ref 0.3–4.2)

## 2024-10-02 PROCEDURE — 84460 ALANINE AMINO (ALT) (SGPT): CPT

## 2024-10-02 PROCEDURE — 80061 LIPID PANEL: CPT

## 2024-10-02 PROCEDURE — 84443 ASSAY THYROID STIM HORMONE: CPT

## 2024-10-02 PROCEDURE — 36415 COLL VENOUS BLD VENIPUNCTURE: CPT

## 2024-10-03 ENCOUNTER — OFFICE VISIT (OUTPATIENT)
Dept: FAMILY MEDICINE | Facility: CLINIC | Age: 59
End: 2024-10-03
Payer: COMMERCIAL

## 2024-10-03 VITALS
BODY MASS INDEX: 25.58 KG/M2 | HEIGHT: 67 IN | SYSTOLIC BLOOD PRESSURE: 138 MMHG | OXYGEN SATURATION: 98 % | TEMPERATURE: 97.8 F | WEIGHT: 163 LBS | HEART RATE: 74 BPM | RESPIRATION RATE: 16 BRPM | DIASTOLIC BLOOD PRESSURE: 82 MMHG

## 2024-10-03 DIAGNOSIS — E04.1 THYROID NODULE: ICD-10-CM

## 2024-10-03 DIAGNOSIS — E78.5 HYPERLIPIDEMIA LDL GOAL <100: ICD-10-CM

## 2024-10-03 DIAGNOSIS — Z00.00 ROUTINE GENERAL MEDICAL EXAMINATION AT A HEALTH CARE FACILITY: Primary | ICD-10-CM

## 2024-10-03 DIAGNOSIS — K64.4 EXTERNAL HEMORRHOIDS: ICD-10-CM

## 2024-10-03 DIAGNOSIS — Z12.11 SCREEN FOR COLON CANCER: ICD-10-CM

## 2024-10-03 DIAGNOSIS — I10 ESSENTIAL HYPERTENSION: ICD-10-CM

## 2024-10-03 PROCEDURE — 99214 OFFICE O/P EST MOD 30 MIN: CPT | Mod: 25 | Performed by: NURSE PRACTITIONER

## 2024-10-03 PROCEDURE — 99396 PREV VISIT EST AGE 40-64: CPT | Performed by: NURSE PRACTITIONER

## 2024-10-03 RX ORDER — HYDROCHLOROTHIAZIDE 12.5 MG/1
12.5 TABLET ORAL DAILY
Qty: 90 TABLET | Refills: 3 | Status: CANCELLED | OUTPATIENT
Start: 2024-10-03

## 2024-10-03 RX ORDER — HYDROCORTISONE 25 MG/G
CREAM TOPICAL 2 TIMES DAILY PRN
Qty: 30 G | Refills: 11 | Status: SHIPPED | OUTPATIENT
Start: 2024-10-03

## 2024-10-03 RX ORDER — LOSARTAN POTASSIUM 100 MG/1
100 TABLET ORAL DAILY
Qty: 90 TABLET | Refills: 3 | Status: SHIPPED | OUTPATIENT
Start: 2024-10-03

## 2024-10-03 RX ORDER — HYDROCHLOROTHIAZIDE 25 MG/1
25 TABLET ORAL DAILY
Qty: 90 TABLET | Refills: 3 | Status: SHIPPED | OUTPATIENT
Start: 2024-10-03

## 2024-10-03 RX ORDER — ROSUVASTATIN CALCIUM 20 MG/1
20 TABLET, COATED ORAL DAILY
Qty: 90 TABLET | Refills: 3 | Status: SHIPPED | OUTPATIENT
Start: 2024-10-03

## 2024-10-03 SDOH — HEALTH STABILITY: PHYSICAL HEALTH: ON AVERAGE, HOW MANY DAYS PER WEEK DO YOU ENGAGE IN MODERATE TO STRENUOUS EXERCISE (LIKE A BRISK WALK)?: 3 DAYS

## 2024-10-03 ASSESSMENT — PAIN SCALES - GENERAL: PAINLEVEL: NO PAIN (0)

## 2024-10-03 ASSESSMENT — SOCIAL DETERMINANTS OF HEALTH (SDOH): HOW OFTEN DO YOU GET TOGETHER WITH FRIENDS OR RELATIVES?: TWICE A WEEK

## 2024-10-03 NOTE — PROGRESS NOTES
"Preventive Care Visit  [facility]  SONIDO Saunders CNP, Family Medicine  Oct 3, 2024        Assessment & Plan     Routine general medical examination at a health care facility    Essential hypertension  Poorly controlled.  Continue:  - losartan (COZAAR) 100 MG tablet; Take 1 tablet (100 mg) by mouth daily.  Increase hydrochlorothiazide to:  - hydrochlorothiazide (HYDRODIURIL) 25 MG tablet; Take 1 tablet (25 mg) by mouth daily.  Encouraged home BP monitoring.    Hyperlipidemia LDL goal <100  Well controlled.  - rosuvastatin (CRESTOR) 20 MG tablet; Take 1 tablet (20 mg) by mouth daily.    External hemorrhoids  - hydrocortisone, Perianal, (HYDROCORTISONE) 2.5 % cream; Place rectally 2 times daily as needed for hemorrhoids.    Thyroid nodule  Due in January:  - US Thyroid; Future    Screen for colon cancer  - Colonoscopy Screening  Referral; Future        Nicotine/Tobacco Cessation  She reports that she has been smoking cigarettes. She started smoking about 41 years ago. She has a 8.8 pack-year smoking history. She has never used smokeless tobacco.  Nicotine/Tobacco Cessation Plan  Information offered: Patient not interested at this time      BMI  Estimated body mass index is 25.91 kg/m  as calculated from the following:    Height as of this encounter: 1.689 m (5' 6.5\").    Weight as of this encounter: 73.9 kg (163 lb).       Counseling  Appropriate preventive services were addressed with this patient via screening, questionnaire, or discussion as appropriate for fall prevention, nutrition, physical activity, Tobacco-use cessation, social engagement, weight loss and cognition.  Checklist reviewing preventive services available has been given to the patient.  Reviewed patient's diet, addressing concerns and/or questions.   She is at risk for lack of exercise and has been provided with information to increase physical activity for the benefit of her well-being.   The patient was " instructed to see the dentist every 6 months.     The risks, benefits and treatment options of prescribed medications or other treatments have been discussed with the patient. The patient verbalized their understanding and should call or follow up if no improvement or if they develop further problems.  NIALL Saunders   Araceli is a 59 year old, presenting for the following:  Physical and Health Maintenance (Declined vaccines today)        10/3/2024     9:06 AM   Additional Questions   Roomed by Mikayla TURPIN         10/3/2024     9:06 AM   Patient Reported Additional Medications   Patient reports taking the following new medications none        Health Care Directive  Patient does not have a Health Care Directive or Living Will: Discussed advance care planning with patient; information given to patient to review.    HPI  Discuss blood work done yesterday    Hypertension Follow-up    Do you check your blood pressure regularly outside of the clinic? No   Are you following a low salt diet? No  doesn't add salt  Are your blood pressures ever more than 140 on the top number (systolic) OR more   than 90 on the bottom number (diastolic), for example 140/90?   BP Readings from Last 3 Encounters:   10/03/24 138/82   01/16/24 120/82   01/02/24 (!) 140/80       Hyperlipidemia Follow-Up    Are you regularly taking any medication or supplement to lower your cholesterol?   Yes- crestor  Are you having muscle aches or other side effects that you think could be caused by your cholesterol lowering medication?  No    Will need follow up thyroid ultrasound in January    Refill hemorrhoid cream          10/3/2024   General Health   How would you rate your overall physical health? Good   Feel stress (tense, anxious, or unable to sleep) To some extent      (!) STRESS CONCERN      10/3/2024   Nutrition   Three or more servings of calcium each day? (!) NO   Diet: Regular (no restrictions)   How many servings of  fruit and vegetables per day? (!) 2-3   How many sweetened beverages each day? (!) 2            10/3/2024   Exercise   Days per week of moderate/strenous exercise 3 days            10/3/2024   Social Factors   Frequency of gathering with friends or relatives Twice a week   Worry food won't last until get money to buy more No   Food not last or not have enough money for food? No   Do you have housing? (Housing is defined as stable permanent housing and does not include staying ouside in a car, in a tent, in an abandoned building, in an overnight shelter, or couch-surfing.) Yes   Are you worried about losing your housing? No   Lack of transportation? No   Unable to get utilities (heat,electricity)? No            10/3/2024   Fall Risk   Fallen 2 or more times in the past year? No   Trouble with walking or balance? No             10/3/2024   Dental   Dentist two times every year? (!) NO            10/3/2024   TB Screening   Were you born outside of the [place]? No            Today's PHQ-2 Score:       10/3/2024     9:02 AM   PHQ-2 ( 1999 [employer])   Q1: Little interest or pleasure in doing things 0   Q2: Feeling down, depressed or hopeless 0   PHQ-2 Score 0   Q1: Little interest or pleasure in doing things Not at all   Q2: Feeling down, depressed or hopeless Not at all   PHQ-2 Score 0           10/3/2024   Substance Use   Alcohol more than 3/day or more than 7/wk No   Do you use any other substances recreationally? No        Social History     Tobacco Use    Smoking status: Light Smoker     Current packs/day: 0.12     Average packs/day: 0.2 packs/day for 41.8 years (8.8 ttl pk-yrs)     Types: Cigarettes     Start date: 1/1/1983    Smokeless tobacco: Never    Tobacco comments:     Social- 5-8 in a week.   Vaping Use    Vaping status: Never Used   Substance Use Topics    Alcohol use: Yes     Comment: social    Drug use: Never           6/28/2023   LAST FHS-7 RESULTS   1st degree relative breast or ovarian cancer Yes    Yes   Any  "relative bilateral breast cancer No    Unknown   Any male have breast cancer No   Any ONE woman have BOTH breast AND ovarian cancer No   Any woman with breast cancer before 50yrs No   2 or more relatives with breast AND/OR ovarian cancer No   2 or more relatives with breast AND/OR bowel cancer No       Multiple values from one day are sorted in reverse-chronological order                10/3/2024   STI Screening   New sexual partner(s) since last STI/HIV test? No        History of abnormal Pap smear: YES - reflected in Problem List and Health Maintenance accordingly        Latest Ref Rng & Units 6/28/2023     9:26 AM 6/24/2022    10:40 AM 9/9/2020    10:30 AM   PAP / HPV   PAP  Negative for Intraepithelial Lesion or Malignancy (NILM)  Negative for Intraepithelial Lesion or Malignancy (NILM)     HPV 16 DNA Negative Negative  Negative  Negative    HPV 18 DNA Negative Negative  Negative  Negative    Other HR HPV Negative Negative  Negative  Positive      ASCVD Risk   The 10-year ASCVD risk score (Cornie AHMADI, et al., 2019) is: 8%    Values used to calculate the score:      Age: 59 years      Sex: Female      Is Non- : No      Diabetic: No      Tobacco smoker: Yes      Systolic Blood Pressure: 138 mmHg      Is BP treated: Yes      HDL Cholesterol: 67 mg/dL      Total Cholesterol: 141 mg/dL           Reviewed and updated as needed this visit by Provider   Tobacco  Allergies  Meds  Problems  Med Hx  Surg Hx  Fam Hx                  Review of Systems  Constitutional, neuro, ENT, endocrine, pulmonary, cardiac, gastrointestinal, genitourinary, musculoskeletal, integument and psychiatric systems are negative, except as otherwise noted.     Objective    Exam  /82   Pulse 74   Temp 97.8  F (36.6  C) (Tympanic)   Resp 16   Ht 1.689 m (5' 6.5\")   Wt 73.9 kg (163 lb)   LMP  (LMP Unknown)   SpO2 98%   BMI 25.91 kg/m     Estimated body mass index is 25.91 kg/m  as calculated from " "the following:    Height as of this encounter: 1.689 m (5' 6.5\").    Weight as of this encounter: 73.9 kg (163 lb).    Physical Exam  GENERAL: alert and no distress  EYES: Eyes grossly normal to inspection, PERRL and conjunctivae and sclerae normal  HENT: ear canals and TM's normal, nose and mouth without ulcers or lesions  NECK: no adenopathy, no asymmetry, masses, or scars  RESP: lungs clear to auscultation - no rales, rhonchi or wheezes  BREAST: normal without masses, tenderness or nipple discharge and no palpable axillary masses or adenopathy  CV: regular rate and rhythm, normal S1 S2, no S3 or S4, no murmur, click or rub, no peripheral edema  ABDOMEN: soft, nontender, no hepatosplenomegaly, no masses and bowel sounds normal  MS: no gross musculoskeletal defects noted, no edema  SKIN: no suspicious lesions or rashes  NEURO: Normal strength and tone, mentation intact and speech normal  PSYCH: mentation appears normal, affect normal/bright        Signed Electronically by: SONIDO Saunders CNP    "

## 2024-10-03 NOTE — PATIENT INSTRUCTIONS
Patient Education   Preventive Care Advice   This is general advice given by our system to help you stay healthy. However, your care team may have specific advice just for you. Please talk to your care team about your preventive care needs.  Nutrition  Eat 5 or more servings of fruits and vegetables each day.  Try wheat bread, brown rice and whole grain pasta (instead of white bread, rice, and pasta).  Get enough calcium and vitamin D. Check the label on foods and aim for 100% of the RDA (recommended daily allowance).  Lifestyle  Exercise at least 150 minutes each week  (30 minutes a day, 5 days a week).  Do muscle strengthening activities 2 days a week. These help control your weight and prevent disease.  No smoking.  Wear sunscreen to prevent skin cancer.  Have a dental exam and cleaning every 6 months.  Yearly exams  See your health care team every year to talk about:  Any changes in your health.  Any medicines your care team has prescribed.  Preventive care, family planning, and ways to prevent chronic diseases.  Shots (vaccines)   HPV shots (up to age 26), if you've never had them before.  Hepatitis B shots (up to age 59), if you've never had them before.  COVID-19 shot: Get this shot when it's due.  Flu shot: Get a flu shot every year.  Tetanus shot: Get a tetanus shot every 10 years.  Pneumococcal, hepatitis A, and RSV shots: Ask your care team if you need these based on your risk.  Shingles shot (for age 50 and up)  General health tests  Diabetes screening:  Starting at age 35, Get screened for diabetes at least every 3 years.  If you are younger than age 35, ask your care team if you should be screened for diabetes.  Cholesterol test: At age 39, start having a cholesterol test every 5 years, or more often if advised.  Bone density scan (DEXA): At age 50, ask your care team if you should have this scan for osteoporosis (brittle bones).  Hepatitis C: Get tested at least once in your life.  STIs (sexually  transmitted infections)  Before age 24: Ask your care team if you should be screened for STIs.  After age 24: Get screened for STIs if you're at risk. You are at risk for STIs (including HIV) if:  You are sexually active with more than one person.  You don't use condoms every time.  You or a partner was diagnosed with a sexually transmitted infection.  If you are at risk for HIV, ask about PrEP medicine to prevent HIV.  Get tested for HIV at least once in your life, whether you are at risk for HIV or not.  Cancer screening tests  Cervical cancer screening: If you have a cervix, begin getting regular cervical cancer screening tests starting at age 21.  Breast cancer scan (mammogram): If you've ever had breasts, begin having regular mammograms starting at age 40. This is a scan to check for breast cancer.  Colon cancer screening: It is important to start screening for colon cancer at age 45.  Have a colonoscopy test every 10 years (or more often if you're at risk) Or, ask your provider about stool tests like a FIT test every year or Cologuard test every 3 years.  To learn more about your testing options, visit:   .  For help making a decision, visit:   https://bit.ly/ij34961.  Prostate cancer screening test: If you have a prostate, ask your care team if a prostate cancer screening test (PSA) at age 55 is right for you.  Lung cancer screening: If you are a current or former smoker ages 50 to 80, ask your care team if ongoing lung cancer screenings are right for you.  For informational purposes only. Not to replace the advice of your health care provider. Copyright   2023 [employer]. All rights reserved. Clinically reviewed by the [facility] Transitions Program. [employer] 285476 - REV 01/24.

## 2024-11-12 ENCOUNTER — TELEPHONE (OUTPATIENT)
Dept: FAMILY MEDICINE | Facility: CLINIC | Age: 59
End: 2024-11-12
Payer: COMMERCIAL

## 2024-11-12 NOTE — TELEPHONE ENCOUNTER
Patient Quality Outreach    Patient is due for the following:   Colon Cancer Screening  Breast Cancer Screening - Mammogram    Action(s) Taken:   No follow up needed at this time.    Type of outreach:    Chart review performed, no outreach needed.  Patient had recent physical and these orders were placed/addressed with patient.    Questions for provider review:    None           Mikayla Frias, CMA

## 2024-12-11 ENCOUNTER — PATIENT OUTREACH (OUTPATIENT)
Dept: CARE COORDINATION | Facility: CLINIC | Age: 59
End: 2024-12-11
Payer: COMMERCIAL

## 2025-01-30 ENCOUNTER — HOSPITAL ENCOUNTER (OUTPATIENT)
Dept: ULTRASOUND IMAGING | Facility: CLINIC | Age: 60
Discharge: HOME | End: 2025-01-30
Attending: NURSE PRACTITIONER
Payer: COMMERCIAL

## 2025-01-30 DIAGNOSIS — E04.1 THYROID NODULE: ICD-10-CM

## 2025-01-30 PROCEDURE — 76536 US EXAM OF HEAD AND NECK: CPT

## 2025-02-10 ENCOUNTER — TELEPHONE (OUTPATIENT)
Dept: FAMILY MEDICINE | Facility: CLINIC | Age: 60
End: 2025-02-10
Payer: COMMERCIAL

## 2025-02-10 NOTE — TELEPHONE ENCOUNTER
Patient Quality Outreach    Patient is due for the following:   Colon Cancer Screening  Breast Cancer Screening - Mammogram    Action(s) Taken:   Due for mammogram and colon screen     Type of outreach:    Sent [employer] message.    Questions for provider review:    None           Shankar Schmidt, CMA

## 2025-02-13 ENCOUNTER — HOSPITAL ENCOUNTER (EMERGENCY)
Facility: CLINIC | Age: 60
Discharge: HOME | End: 2025-02-13
Attending: EMERGENCY MEDICINE | Admitting: EMERGENCY MEDICINE

## 2025-02-13 VITALS
HEART RATE: 87 BPM | BODY MASS INDEX: 27.32 KG/M2 | SYSTOLIC BLOOD PRESSURE: 146 MMHG | WEIGHT: 170 LBS | DIASTOLIC BLOOD PRESSURE: 91 MMHG | OXYGEN SATURATION: 100 % | TEMPERATURE: 98.2 F | HEIGHT: 66 IN | RESPIRATION RATE: 16 BRPM

## 2025-02-13 DIAGNOSIS — M79.621 PAIN OF RIGHT UPPER ARM: ICD-10-CM

## 2025-02-13 DIAGNOSIS — S46.911A STRAIN OF RIGHT UPPER ARM, INITIAL ENCOUNTER: ICD-10-CM

## 2025-02-13 PROCEDURE — 99283 EMERGENCY DEPT VISIT LOW MDM: CPT | Performed by: EMERGENCY MEDICINE

## 2025-02-13 PROCEDURE — 96372 THER/PROPH/DIAG INJ SC/IM: CPT | Performed by: EMERGENCY MEDICINE

## 2025-02-13 PROCEDURE — 99284 EMERGENCY DEPT VISIT MOD MDM: CPT | Performed by: EMERGENCY MEDICINE

## 2025-02-13 PROCEDURE — 250N000011 HC RX IP 250 OP 636: Performed by: EMERGENCY MEDICINE

## 2025-02-13 PROCEDURE — 250N000013 HC RX MED GY IP 250 OP 250 PS 637: Performed by: EMERGENCY MEDICINE

## 2025-02-13 RX ORDER — OXYCODONE HYDROCHLORIDE 5 MG/1
5 TABLET ORAL EVERY 6 HOURS PRN
Qty: 8 TABLET | Refills: 0 | Status: SHIPPED | OUTPATIENT
Start: 2025-02-13

## 2025-02-13 RX ORDER — KETOROLAC TROMETHAMINE 30 MG/ML
30 INJECTION, SOLUTION INTRAMUSCULAR; INTRAVENOUS ONCE
Status: COMPLETED | OUTPATIENT
Start: 2025-02-13 | End: 2025-02-13

## 2025-02-13 RX ORDER — OXYCODONE HYDROCHLORIDE 5 MG/1
5 TABLET ORAL ONCE
Status: COMPLETED | OUTPATIENT
Start: 2025-02-13 | End: 2025-02-13

## 2025-02-13 RX ADMIN — OXYCODONE 5 MG: 5 TABLET ORAL at 12:09

## 2025-02-13 RX ADMIN — KETOROLAC TROMETHAMINE 30 MG: 30 INJECTION, SOLUTION INTRAMUSCULAR; INTRAVENOUS at 12:07

## 2025-02-13 ASSESSMENT — ACTIVITIES OF DAILY LIVING (ADL): ADLS_ACUITY_SCORE: 41

## 2025-02-13 ASSESSMENT — COLUMBIA-SUICIDE SEVERITY RATING SCALE - C-SSRS
6. HAVE YOU EVER DONE ANYTHING, STARTED TO DO ANYTHING, OR PREPARED TO DO ANYTHING TO END YOUR LIFE?: NO
2. HAVE YOU ACTUALLY HAD ANY THOUGHTS OF KILLING YOURSELF IN THE PAST MONTH?: NO
1. IN THE PAST MONTH, HAVE YOU WISHED YOU WERE DEAD OR WISHED YOU COULD GO TO SLEEP AND NOT WAKE UP?: NO

## 2025-02-13 NOTE — DISCHARGE INSTRUCTIONS
Take ibuprofen, 600 mg, 4 times per day if needed for pain.  You may add acetaminophen 1000 mg 4 times per day if needed for pain.    You may add oxycodone 5 mg, 1-2 tablets up to every 6 hours if needed for pain.  Try to use this primarily only at night to help with sleep.    Do not use alcohol, operate machinery, drive, or climb on ladders, or perform other complex motor activity or make important decisions for 8 hours after taking oxycodone. Use docusate (100mg) 2 times a day to prevent constipation while on narcotics.

## 2025-02-13 NOTE — ED TRIAGE NOTES
Pt presents with right shoulder pain since vacuuming on Tuesday. Has been using tylenol, ibuprofen and ice without relief.      Triage Assessment (Adult)       Row Name 02/13/25 1133          Triage Assessment    Airway WDL WDL        Respiratory WDL    Respiratory WDL WDL        Skin Circulation/Temperature WDL    Skin Circulation/Temperature WDL WDL        Cardiac WDL    Cardiac WDL WDL        Peripheral/Neurovascular WDL    Peripheral Neurovascular WDL WDL        Cognitive/Neuro/Behavioral WDL    Cognitive/Neuro/Behavioral WDL WDL

## 2025-02-13 NOTE — ED PROVIDER NOTES
ED Provider Note  [facility]      History     Chief Complaint   Patient presents with    Shoulder Pain     HPI  Araceli Wilson is a 59 year old female who presents to the emergency department with approximately 2-day history of increased amounts of right upper arm pain.  Patient tells me she was at work, pushing a heavy vacuum, and began experiencing increased amounts of right arm pain, located primarily in the right bicep region.  No radiation distal to the elbow.  No pain of the shoulder itself.  No other traumatic injury, with no fall.  No prior injury to the right upper extremity.        Independent Historian:        Review of External Notes:          Allergies:  No Known Allergies    Problem List:    Patient Active Problem List    Diagnosis Date Noted    Thyroid nodule 01/10/2024     Priority: Medium     2024 - 4 nodules in left thyroid gland.  Repeat ultrasound in 1 year      Cervical high risk HPV (human papillomavirus) test positive 2018     Priority: Medium     ,  NIL Paps  2005 NIL Pap  2006 NIL Pap  2007 NIL Pap  2009 NIL Pap  16 NIL Pap, + HR HPV   3/13/ NIL Pap, + HR HPV   18  NIL Pap, neg HPV   All above from Care Everywhere    20 NIL Pap, + HR HPV (neg 16/18). Plan cotest in 1 year.   10/27/21 Lost to follow-up for pap tracking  22 NIL pap, neg HPV. Plan: cotest in 1 year  23 Reminder letter  23 NIL Pap, Neg HR HPV Plan cotest in 3 years       Essential hypertension 2018     Priority: Medium        Past Medical History:    Past Medical History:   Diagnosis Date    Cervical high risk HPV (human papillomavirus) test positive , , 2020       Past Surgical History:    Past Surgical History:   Procedure Laterality Date    SURGICAL HISTORY OF -   5700-1001     x2       Family History:    No family history on file.    Social History:  Marital Status:   [4]  Social History     Tobacco Use     "Smoking status: Light Smoker     Current packs/day: 0.12     Average packs/day: 0.2 packs/day for 42.1 years (8.9 ttl pk-yrs)     Types: Cigarettes     Start date: 1/1/1983    Smokeless tobacco: Never    Tobacco comments:     Social- 5-8 in a week.   Vaping Use    Vaping status: Never Used   Substance Use Topics    Alcohol use: Yes     Comment: social    Drug use: Never        Medications:    oxyCODONE (ROXICODONE) 5 MG tablet  COLLAGEN PO  fish oil-omega-3 fatty acids 1000 MG capsule  hydrochlorothiazide (HYDRODIURIL) 25 MG tablet  hydrocortisone, Perianal, (HYDROCORTISONE) 2.5 % cream  losartan (COZAAR) 100 MG tablet  Multiple Vitamin (MULTIVITAMIN ADULT PO)  rosuvastatin (CRESTOR) 20 MG tablet          Review of Systems  A medically appropriate review of systems was performed with pertinent positives and negatives noted in the HPI, and all other systems negative.    Physical Exam   Patient Vitals for the past 24 hrs:   Temp Temp src Pulse Resp SpO2 Height Weight   02/13/25 1134 98.2  F (36.8  C) Oral 99 16 100 % 1.676 m (5' 6\") 77.1 kg (170 lb)          Physical Exam  General: alert and in acute distress on arrival  Head: atraumatic, normocephalic  Lungs:  nonlabored  CV:  extremities warm and perfused  Abd: nondistended  Right arm.  Shoulder appears well located, and able to shrug the shoulder without any difficulty, however with range of motion of the right arm, patient is very hesitant, complaining of pain, and does have localized tenderness of the proximal third of the right bicep region.  No pain at the elbow, with normal distal perfusion, pulses and sensation.  Skin: no rashes, no diaphoresis and skin color normal  Neuro: Patient awake, alert, speech is fluent,   Psychiatric: tearful      ED Course                 Procedures                 None         No results found for this or any previous visit (from the past 24 hours).    MEDICATIONS GIVEN IN THE EMERGENCY DEPARTMENT:  Medications   ketorolac " (TORADOL) injection 30 mg (30 mg Intramuscular $Given 2/13/25 1206)   oxyCODONE (ROXICODONE) tablet 5 mg (5 mg Oral $Given 2/13/25 7326)           Independent Interpretation (X-rays, CTs, rhythm strip):      Consultations/Discussion of Management or Tests:  None       Social Determinants of Health affecting care:         Assessments & Plan (with Medical Decision Making)  59 year old female who presents to the Emergency Department for evaluation of right upper arm pain.  Symptoms present over the past 2 days after the patient was pushing a vacuum.  No other specific traumatic injury.  Patient neurovascularly intact in the right upper extremity.  No indication for x-ray imaging as she does not have any specific bony tenderness.  Likely ligamentous injury, and patient will be recommended symptomatic treatment, referral to sports medicine/orthopedics if ongoing symptoms.  Given sling for comfort, with activity as tolerated.       I have reviewed the nursing notes.    I have reviewed the findings, diagnosis, plan and need for follow up with the patient.         Medical Decision Making  The patient's presentation was of low complexity (an acute and uncomplicated illness or injury).    The patient's evaluation involved:  history and exam without other MDM data elements    The patient's management necessitated moderate risk (prescription drug management including medications given in the ED).        NEW PRESCRIPTIONS STARTED AT TODAY'S ER VISIT  New Prescriptions    OXYCODONE (ROXICODONE) 5 MG TABLET    Take 1 tablet (5 mg) by mouth every 6 hours as needed for severe pain.       Final diagnoses:   Pain of right upper arm   Strain of right upper arm, initial encounter       2/13/2025   [facility] EMERGENCY DEPT       Kemar, [place] MD  02/13/25 9677

## 2025-03-17 ENCOUNTER — OFFICE VISIT (OUTPATIENT)
Dept: DERMATOLOGY | Facility: CLINIC | Age: 60
End: 2025-03-17
Payer: COMMERCIAL

## 2025-03-17 DIAGNOSIS — L82.0 INFLAMED SEBORRHEIC KERATOSIS: ICD-10-CM

## 2025-03-17 DIAGNOSIS — L81.4 LENTIGO: Primary | ICD-10-CM

## 2025-03-17 DIAGNOSIS — L82.1 SEBORRHEIC KERATOSES: ICD-10-CM

## 2025-03-17 PROCEDURE — 17110 DESTRUCTION B9 LES UP TO 14: CPT | Performed by: DERMATOLOGY

## 2025-03-17 PROCEDURE — 99202 OFFICE O/P NEW SF 15 MIN: CPT | Mod: 25 | Performed by: DERMATOLOGY

## 2025-03-17 ASSESSMENT — PAIN SCALES - GENERAL: PAINLEVEL_OUTOF10: NO PAIN (0)

## 2025-03-17 NOTE — PROGRESS NOTES
Araceli Wilson is an extremely pleasant 59 year old year old female patient here today for itching spot on left shin and spots on arms.  Patient has no other skin complaints today.  Remainder of the HPI, Meds, PMH, Allergies, FH, and SH was reviewed in chart.      Past Medical History:   Diagnosis Date    Cervical high risk HPV (human papillomavirus) test positive , 2017, 2020    see problem list       Past Surgical History:   Procedure Laterality Date    SURGICAL HISTORY OF -   [id number]     x2        No family history on file.    Social History     Socioeconomic History    Marital status:      Spouse name: Not on file    Number of children: Not on file    Years of education: Not on file    Highest education level: Not on file   Occupational History    Not on file   Tobacco Use    Smoking status: Light Smoker     Current packs/day: 0.12     Average packs/day: 0.2 packs/day for 42.2 years (8.9 ttl pk-yrs)     Types: Cigarettes     Start date: 1983    Smokeless tobacco: Never    Tobacco comments:     Social- 5-8 in a week.   Vaping Use    Vaping status: Never Used   Substance and Sexual Activity    Alcohol use: Yes     Comment: social    Drug use: Never    Sexual activity: Yes     Partners: Male   Other Topics Concern    Not on file   Social History Narrative    Not on file     Social Drivers of Health     Financial Resource Strain: Low Risk  (10/3/2024)    Financial Resource Strain     Within the past 12 months, have you or your family members you live with been unable to get utilities (heat, electricity) when it was really needed?: No   Food Insecurity: Low Risk  (10/3/2024)    Food Insecurity     Within the past 12 months, did you worry that your food would run out before you got money to buy more?: No     Within the past 12 months, did the food you bought just not last and you didn t have money to get more?: No   Transportation Needs: Low Risk  (10/3/2024)    Transportation Needs      Within the past 12 months, has lack of transportation kept you from medical appointments, getting your medicines, non-medical meetings or appointments, work, or from getting things that you need?: No   Physical Activity: Unknown (10/3/2024)    Exercise Vital Sign     Days of Exercise per Week: 3 days     Minutes of Exercise per Session: Not on file   Stress: Stress Concern Present (10/3/2024)    [employer] [place] [employer] - Occupational Stress Questionnaire     Feeling of Stress : To some extent   Social Connections: Unknown (10/3/2024)    Social Connection and Isolation Panel [NHANES]     Frequency of Communication with Friends and Family: Not on file     Frequency of Social Gatherings with Friends and Family: Twice a week     Attends [employer] Services: Not on file     Active Member of Clubs or Organizations: Not on file     Attends Club or Organization Meetings: Not on file     Marital Status: Not on file   Interpersonal Safety: Low Risk  (10/3/2024)    Interpersonal Safety     Do you feel physically and emotionally safe where you currently live?: Yes     Within the past 12 months, have you been hit, slapped, kicked or otherwise physically hurt by someone?: No     Within the past 12 months, have you been humiliated or emotionally abused in other ways by your partner or ex-partner?: No   Housing Stability: Low Risk  (10/3/2024)    Housing Stability     Do you have housing? : Yes     Are you worried about losing your housing?: No       Outpatient Encounter Medications as of 3/17/2025   Medication Sig Dispense Refill    COLLAGEN PO       fish oil-omega-3 fatty acids 1000 MG capsule Take 2 g by mouth      hydrochlorothiazide (HYDRODIURIL) 25 MG tablet Take 1 tablet (25 mg) by mouth daily. 90 tablet 3    hydrocortisone, Perianal, (HYDROCORTISONE) 2.5 % cream Place rectally 2 times daily as needed for hemorrhoids. 30 g 11    losartan (COZAAR) 100 MG tablet Take 1 tablet (100 mg) by mouth daily. 90 tablet  3    Multiple Vitamin (MULTIVITAMIN ADULT PO) Take by mouth daily      oxyCODONE (ROXICODONE) 5 MG tablet Take 1 tablet (5 mg) by mouth every 6 hours as needed for severe pain. 8 tablet 0    rosuvastatin (CRESTOR) 20 MG tablet Take 1 tablet (20 mg) by mouth daily. 90 tablet 3     No facility-administered encounter medications on file as of 3/17/2025.             O:   NAD, WDWN, Alert & Oriented, Mood & Affect wnl, Vitals stable   General appearance normal   Vitals stable   Alert, oriented and in no acute distress     L shin inflamed seborrheic keratosis    Stuck on papules and brown macules on trunk and ext       Eyes: Conjunctivae/lids:Normal     ENT: Lips, mucosa: normal    MSK:Normal    Cardiovascular: peripheral edema none    Pulm: Breathing Normal    Neuro/Psych: Orientation:Alert and Orientedx3 ; Mood/Affect:normal       A/P:  1. Seborrheic keratosis, lentigo,   2. L shin inflamed seborrheic keratosis   LN2:  Treated with LN2 for 5s for 1-2 cycles. Warned risks of blistering, pain, pigment change, scarring, and incomplete resolution.  Advised patient to return if lesions do not completely resolve.  Wound care sheet given.  It was a pleasure speaking to Araceli Wilson today.  Previous clinic notes and pertinent laboratory tests were reviewed prior to Araceli Wilosn's visit.  Nature and genetics of benign skin lesions dicussed with patient.  Patient encouraged to perform monthly skin exams.  UV precautions reviewed with patient.

## 2025-03-17 NOTE — LETTER
3/17/2025      Araceli Wilson  [id number]  [facility] [address]      Dear Colleague,    Thank you for referring your patient, Araceli Wilson, to the [facility]. Please see a copy of my visit note below.    Araceli Wilson is an extremely pleasant 59 year old year old female patient here today for itching spot on left shin and spots on arms.  Patient has no other skin complaints today.  Remainder of the HPI, Meds, PMH, Allergies, FH, and SH was reviewed in chart.      Past Medical History:   Diagnosis Date     Cervical high risk HPV (human papillomavirus) test positive , , 2020    see problem list       Past Surgical History:   Procedure Laterality Date     SURGICAL HISTORY OF -   [id number]     x2        No family history on file.    Social History     Socioeconomic History     Marital status:      Spouse name: Not on file     Number of children: Not on file     Years of education: Not on file     Highest education level: Not on file   Occupational History     Not on file   Tobacco Use     Smoking status: Light Smoker     Current packs/day: 0.12     Average packs/day: 0.2 packs/day for 42.2 years (8.9 ttl pk-yrs)     Types: Cigarettes     Start date: 1983     Smokeless tobacco: Never     Tobacco comments:     Social- 5-8 in a week.   Vaping Use     Vaping status: Never Used   Substance and Sexual Activity     Alcohol use: Yes     Comment: social     Drug use: Never     Sexual activity: Yes     Partners: Male   Other Topics Concern     Not on file   Social History Narrative     Not on file     Social Drivers of Health     Financial Resource Strain: Low Risk  (10/3/2024)    Financial Resource Strain      Within the past 12 months, have you or your family members you live with been unable to get utilities (heat, electricity) when it was really needed?: No   Food Insecurity: Low Risk  (10/3/2024)    Food Insecurity      Within the past 12 months, did you  worry that your food would run out before you got money to buy more?: No      Within the past 12 months, did the food you bought just not last and you didn t have money to get more?: No   Transportation Needs: Low Risk  (10/3/2024)    Transportation Needs      Within the past 12 months, has lack of transportation kept you from medical appointments, getting your medicines, non-medical meetings or appointments, work, or from getting things that you need?: No   Physical Activity: Unknown (10/3/2024)    Exercise Vital Sign      Days of Exercise per Week: 3 days      Minutes of Exercise per Session: Not on file   Stress: Stress Concern Present (10/3/2024)    [employer] [place] [employer] - Occupational Stress Questionnaire      Feeling of Stress : To some extent   Social Connections: Unknown (10/3/2024)    Social Connection and Isolation Panel [NHANES]      Frequency of Communication with Friends and Family: Not on file      Frequency of Social Gatherings with Friends and Family: Twice a week      Attends [employer] Services: Not on file      Active Member of Clubs or Organizations: Not on file      Attends Club or Organization Meetings: Not on file      Marital Status: Not on file   Interpersonal Safety: Low Risk  (10/3/2024)    Interpersonal Safety      Do you feel physically and emotionally safe where you currently live?: Yes      Within the past 12 months, have you been hit, slapped, kicked or otherwise physically hurt by someone?: No      Within the past 12 months, have you been humiliated or emotionally abused in other ways by your partner or ex-partner?: No   Housing Stability: Low Risk  (10/3/2024)    Housing Stability      Do you have housing? : Yes      Are you worried about losing your housing?: No       Outpatient Encounter Medications as of 3/17/2025   Medication Sig Dispense Refill     COLLAGEN PO        fish oil-omega-3 fatty acids 1000 MG capsule Take 2 g by mouth       hydrochlorothiazide  (HYDRODIURIL) 25 MG tablet Take 1 tablet (25 mg) by mouth daily. 90 tablet 3     hydrocortisone, Perianal, (HYDROCORTISONE) 2.5 % cream Place rectally 2 times daily as needed for hemorrhoids. 30 g 11     losartan (COZAAR) 100 MG tablet Take 1 tablet (100 mg) by mouth daily. 90 tablet 3     Multiple Vitamin (MULTIVITAMIN ADULT PO) Take by mouth daily       oxyCODONE (ROXICODONE) 5 MG tablet Take 1 tablet (5 mg) by mouth every 6 hours as needed for severe pain. 8 tablet 0     rosuvastatin (CRESTOR) 20 MG tablet Take 1 tablet (20 mg) by mouth daily. 90 tablet 3     No facility-administered encounter medications on file as of 3/17/2025.             O:   NAD, WDWN, Alert & Oriented, Mood & Affect wnl, Vitals stable   General appearance normal   Vitals stable   Alert, oriented and in no acute distress     L shin inflamed seborrheic keratosis    Stuck on papules and brown macules on trunk and ext       Eyes: Conjunctivae/lids:Normal     ENT: Lips, mucosa: normal    MSK:Normal    Cardiovascular: peripheral edema none    Pulm: Breathing Normal    Neuro/Psych: Orientation:Alert and Orientedx3 ; Mood/Affect:normal       A/P:  1. Seborrheic keratosis, lentigo,   2. L shin inflamed seborrheic keratosis   LN2:  Treated with LN2 for 5s for 1-2 cycles. Warned risks of blistering, pain, pigment change, scarring, and incomplete resolution.  Advised patient to return if lesions do not completely resolve.  Wound care sheet given.  It was a pleasure speaking to Araceli Wilson today.  Previous clinic notes and pertinent laboratory tests were reviewed prior to Araceli Wilson's visit.  Nature and genetics of benign skin lesions dicussed with patient.  Patient encouraged to perform monthly skin exams.  UV precautions reviewed with patient.      Again, thank you for allowing me to participate in the care of your patient.        Sincerely,        Giovanni Ron MD    Electronically signed

## 2025-05-29 ENCOUNTER — PATIENT OUTREACH (OUTPATIENT)
Dept: CARE COORDINATION | Facility: CLINIC | Age: 60
End: 2025-05-29
Payer: COMMERCIAL

## 2025-06-26 ENCOUNTER — PATIENT OUTREACH (OUTPATIENT)
Dept: CARE COORDINATION | Facility: CLINIC | Age: 60
End: 2025-06-26
Payer: COMMERCIAL

## 2025-09-03 ENCOUNTER — PATIENT OUTREACH (OUTPATIENT)
Dept: CARE COORDINATION | Facility: CLINIC | Age: 60
End: 2025-09-03
Payer: COMMERCIAL